# Patient Record
Sex: MALE | Race: NATIVE HAWAIIAN OR OTHER PACIFIC ISLANDER | Employment: FULL TIME | URBAN - METROPOLITAN AREA
[De-identification: names, ages, dates, MRNs, and addresses within clinical notes are randomized per-mention and may not be internally consistent; named-entity substitution may affect disease eponyms.]

---

## 2020-02-06 ENCOUNTER — OFFICE VISIT (OUTPATIENT)
Dept: ORTHOPEDIC SURGERY | Age: 65
End: 2020-02-06
Payer: COMMERCIAL

## 2020-02-06 VITALS — WEIGHT: 185 LBS | HEIGHT: 72 IN | BODY MASS INDEX: 25.06 KG/M2

## 2020-02-06 PROCEDURE — 99213 OFFICE O/P EST LOW 20 MIN: CPT | Performed by: ORTHOPAEDIC SURGERY

## 2020-02-06 PROCEDURE — L3170 FOOT PLAS HEEL STABI PRE OTS: HCPCS | Performed by: ORTHOPAEDIC SURGERY

## 2020-02-06 NOTE — PROGRESS NOTES
application of this item were provided. They were instructed to contact the office immediately should the brace result in increased pain, decreased sensation, increased swelling or worsening of the condition. Treatment Plan:  I discussed with the patient the nature of osteoarthritis of the knee. We talked about treatment of arthritis and the various options that are involved with this. The patient understands that the treatments can vary from essentially doing nothing to a total joint replacement arthroplasty for arthritis. I then went on to describe the utilization of glucosamine and chondroitin sulfate as a joint nutrition product. We talked about the fact that this is essentially a joint vitamin with typically minimal side effects. We also talked about utilization of prescription over-the-counter anti-inflammatory medications as the next option. We also discussed the possibility of brace wear or orthotic wear if the patient has significant varus alignment. We then went on to discuss the possibility of Visco supplementation with hyaluronate products. We talked about the typical course of this type of treatment and the fact that often times in the treatment for significant arthritis, this is successful less than half the time. We also talked about the corticosteroid injections and the fact that this can give a brief window of relief, but does not cure the problem; in fact, the pain often has a rebound effect in 6-10 weeks after the steroid has worn off. We also discussed arthroscopy surgery in attempts to debride the joint, but the fact that this is relatively unreliable treatment in the face of significant arthritis. It can occasionally be used, particularly if there is significant meniscus pathology. Lastly we discussed total joint replacement arthroplasty as the final and definitive step in treatment of arthritis.  Patient realizes the magnitude of this type of treatment as well as having voiced a general

## 2020-08-27 ENCOUNTER — OFFICE VISIT (OUTPATIENT)
Dept: ORTHOPEDIC SURGERY | Age: 65
End: 2020-08-27
Payer: COMMERCIAL

## 2020-08-27 VITALS — HEIGHT: 72 IN | WEIGHT: 185 LBS | BODY MASS INDEX: 25.06 KG/M2

## 2020-08-27 PROCEDURE — 99214 OFFICE O/P EST MOD 30 MIN: CPT | Performed by: ORTHOPAEDIC SURGERY

## 2020-08-27 NOTE — PROGRESS NOTES
Chief Complaint    Knee Pain (lt knee wants to discuss TKR)      History of Present Illness:  Seble Villatoro is a 76 y.o. male who returns today for follow-up on his left knee valgus osteoarthritis. Patient was last seen in February of this year. His primary care physician is Dr. Jace Garcia. Patient states that since his last visit in February he is now having moderate to occasional severe pain with weightbearing activity. He finds that ambulation is decreasing secondary to pain. Pain is mainly over the lateral aspect of the left knee and he feels a sense of instability with descending steps and with pivoting. He has a significant previous history of 2 open procedures with the medial and lateral incision. During one of these procedures he did have a patellar tendon repair performed. He has had previous cortisone injections, over-the-counter medication, prescription NSAID, and he is tried bracing in the past with minimal benefit. PAIN:   Pain Assessment  Location of Pain: Knee  Location Modifiers: Left  Severity of Pain: 6  Frequency of Pain: Intermittent  Aggravating Factors: Walking, Stairs, Standing  Limiting Behavior: Some  Result of Injury: No  Work-Related Injury: No    The patients chronic pain has gradually worsening over the last 1 year. The patient rates pain on a level of 6/10. Pain impacts patients ability to drive, sleep and climb stairs. Medical History:   Patient's medications, allergies, past medical, surgical, social and family histories were reviewed and updated as appropriate. Medication Management  Patient has been treated with NSAIDs, Steroids and Analgesics with Minimal relief for 1 year. Review of Systems:  Relevant review of systems reviewed and available in the patient's chart    Vital Signs: There were no vitals filed for this visit. Body mass index is 25.09 kg/m².     Limitation in Activities of Daily Living (ADLs)  The patient is able to ambulate without the assistance of cane for 5 steps  steps/feet. Walking distance less than 1 block    [facility]: No     Safety Issues: None at this point  Patient is at risk for falls/fall history: No    General Exam:   Constitutional: Patient is adequately groomed with no evidence of malnutrition  DTRs: Deep tendon reflexes are intact  Mental Status: The patient is oriented to time, place and person. The patient's mood and affect are appropriate. Lymphatic: The lymphatic examination bilaterally reveals all areas to be without enlargement or induration. Vascular: Examination reveals no swelling or calf tenderness. Peripheral pulses are palpable and 2+. Neurological: The patient has good coordination. There is no weakness or sensory deficit. Left knee Examination:    Inspection:  No erythema or signs of infection. Positive knee effusion. There are no cutaneous lesions. There is a valgus deformity noted. Palpation:  There is tenderness to palpation along the lateral joint line. Range of Motion: 0 extension to 110 degrees of active flexion. Crepitation present throughout range of motion    Strength:  4+/5 quadriceps and hamstrings    Special Tests: The knee is stable to varus valgus stressing/anterior posterior drawer. Negative Homans test.                                 Skin: There are no rashes, ulcerations or lesions. Gait: mildly antalgic favoring the left side      Additional Comments:     Examination of the right knee reveals intact skin. There is no focal tenderness. The patient demonstrates full painless range of motion with regard to flexion and extension. Strength is 5/5 throughout all planes. Ligamentous stability is grossly intact. Examination of the left hip reveals intact skin. The patient demonstrates full painless range of motion with regards to flexion, abduction, internal and external rotation. There is no tenderness about the greater trochanter. There is a negative straight leg raise against resistance. Strength is 5/5 throughout all planes. Radiology:   X-rays obtained and reviewed in office:  Views 4 views to include  Location AP, flexed knee, lateral and sunrise views of the left knee:    Impression:   There is end-stage osteoarthritis within the lateral compartment with sclerosis and osteophyte formation present. There is erosion of the lateral tibia noted. The patellofemoral joint shows moderate to severe osteoarthritis with peripheral osteophyte formation. No fractures are identified. Failed Outpatient management or Previous Surgical Intervention  Patient has undergone conservative treatment of left knee for the past 1 year. Patient has undergone therapy consisting of at least 3 months of physical therapy which included muscle strengthening and flexibility program, cortisone injections, Visco supplementation, different oral medications including NSAIDs and analgesics, efforts at weight loss, and activity modification for years with no improvement in  pain relief or function. Impression:  Encounter Diagnosis   Name Primary?  Primary osteoarthritis of left knee Yes       Office Procedures:  Orders Placed This Encounter   Procedures    XR KNEE LEFT (MIN 4 VIEWS)     Standing Status:   Future     Number of Occurrences:   1     Standing Expiration Date:   8/27/2021   Guill[facility] Parisi PT [address] [facility]     Referral Priority:   Routine     Referral Type:   Eval and Treat     Referral Reason:   Specialty Services Required     Requested Specialty:   Physical Therapy     Number of Visits Requested:   1       Treatment Plan:  I discussed with the patient the nature of osteoarthritis of the knee. We talked about treatment of arthritis and the various options that are involved with this. The patient understands that the treatments can vary from essentially doing nothing to a total joint replacement arthroplasty for arthritis.  I then went on to describe the utilization of glucosamine and chondroitin sulfate as a joint nutrition product. We talked about the fact that this is essentially a joint vitamin with typically minimal side effects. We also talked about utilization of prescription over-the-counter anti-inflammatory medications as the next option. We also discussed the possibility of brace wear or orthotic wear if the patient has significant varus alignment. We then went on to discuss the possibility of Visco supplementation with hyaluronate products. We talked about the typical course of this type of treatment and the fact that often times in the treatment for significant arthritis, this is successful less than half the time. We also talked about the corticosteroid injections and the fact that this can give a brief window of relief, but does not cure the problem; in fact, the pain often has a rebound effect in 6-10 weeks after the steroid has worn off. We also discussed arthroscopy surgery in attempts to debride the joint, but the fact that this is relatively unreliable treatment in the face of significant arthritis. It can occasionally be used, particularly if there is significant meniscus pathology. Lastly we discussed total joint replacement arthroplasty as the final and definitive step in treatment of arthritis. Patient realizes the magnitude of this type of treatment as well as having voiced a general understanding to the duration of the prosthesis. The patient voiced understanding to these continuum of treatment options. At this point the patient has failed conservative treatment as mentioned above. They would like to go ahead and proceed with a left Soren total knee replacement with robotic assistance. This does require nondiagnostic CT scan for surgical planning. The patient is aware that this may or may not be covered by the insurance provider and would be an out-of-pocket expense.     We discussed the risk, benefits, and potential complications of knee replacement arthroplasty surgery. The patient voiced their understanding to concerns that include infection, deep vein thrombosis, neurological injury, and delayed rehabilitation. The patient also realizes that there are concerns regarding the potential need for manipulation under anesthesia if range of motion proves to be problematic. The patient also understands that there is always a chance of dystrophy and anesthetic complications that would include a stroke, cardiopulmonary pathology, and even death. We also discussed the rehabilitation process involved with this operation and options that involved not only the hospitalization but outpatient physical therapy and independent home exercise program.  The patient also realizes the need for a knee brace and ambulatory aids in the rehabilitation process as well as the very significant role that the patient plays in terms of rehabilitation after this type of operation. The patient also understands that although the patient typically functional by 6-8 weeks postop that it may take 9 months to year for full recovery. All questions were answered. Today we have had a discussion regarding his diagnosis and treatment. Today we have gone through the details of total knee arthroplasty and the patient would like to proceed with total knee arthroplasty in January 2021. Today he was registered with [facility] and the consent was signed. Patient will follow-up in December 2020 for preoperative discussion. In the meantime the patient will work on his range of motion and strength and will be sent to PT.     Demand matching tool: Advanced

## 2020-08-27 NOTE — LETTER
The undersigned represents that he or she is duly authorized to execute to this consent for and on the behalf of the above named patient.    ________________________________             __________________________________________  Witness                                                                         Parent/Spouse/Guardian/Other:_________________    Medical Record#  Insurance  Smartphone:  Yes   Or   No  Email:                   You have signed a consent to have a total joint replacement surgery performed. Before you can proceed with surgery the following things must be done. Please use this form as a checklist.      _____   Please schedule your Physical Therapy functional evaluation. (Allowed locations are listed on the order)    _____   Please take your lab orders and get your blood work done at a [facility]. (If needed, please use the web site to find the location closest to you:  [email or web address]) You do not need an appointment and you do not need to fast.    _____  If you did not register in the office, you will need to go to the website for [facility] [email or web address]) to complete registration and the medical questionnaire prior to your physical therapy evaluation. Do not schedule an appointment with your primary care physician until you have a surgery date. This pre-op history and physical has to be within 30 days of the surgery. _____  CT Scan. This will be scheduled once your surgery has been scheduled. _____  Information about the pre-op class, your CT scan, your post-op appointment and cold therapy options will be in your surgery packet that will be mailed to you after you are scheduled for surgery. Once you have completed both the labs and the Physical Therapy evaluation please call Litjaret Nafisa @ [phone] to let her know.   Once verification of the PT Evaluation and completed labs has been determined you will be called and set up for surgery. This may take 1-2 days to check results and return your phone call. You will not be called about lab results if everything is normal.      Please make sure you have not blocked our number. Crista Cueva will call from [phone] / [phone]. Also, please make sure your voice mail is not full.

## 2020-12-03 ENCOUNTER — OFFICE VISIT (OUTPATIENT)
Dept: ORTHOPEDIC SURGERY | Age: 65
End: 2020-12-03
Payer: COMMERCIAL

## 2020-12-03 PROCEDURE — 99213 OFFICE O/P EST LOW 20 MIN: CPT | Performed by: ORTHOPAEDIC SURGERY

## 2020-12-03 PROCEDURE — L1830 KO IMMOB CANVAS LONG PRE OTS: HCPCS | Performed by: ORTHOPAEDIC SURGERY

## 2020-12-03 NOTE — PROGRESS NOTES
Chief Complaint    Follow-up (LEFT TKR scheduled 1/13/20; has not completed labs and PT yet)      History of Present Illness:  Ellie Zhou is a 76 y.o. male who returns today for follow-up on his left knee valgus osteoarthritis. We last saw the patient back in August.  At that time we did consent the patient for a left total knee replacement but he had decided not to proceed with surgery until January. He is now scheduled for his left total knee replacement surgery on January 13. We asked that he come in this month prior to surgery to discuss any remaining details and to get him scheduled for his lab work and PT evaluation. He continues to complain of fairly persistent pain in his left knee with episodes of buckling and instability. PAIN:   Pain Assessment  Location of Pain: Knee  Location Modifiers: Left  Severity of Pain: 4  Quality of Pain: Buckling, Dull  Duration of Pain: Persistent  Frequency of Pain: Intermittent  Aggravating Factors: Walking, Standing, Squatting(weakness)  Limiting Behavior: Some  Relieving Factors: Rest    The patients chronic pain has gradually worsening over the last 1 year. The patient rates pain on a level of 6/10. Pain impacts patients ability to drive, sleep and climb stairs. Medical History:   Patient's medications, allergies, past medical, surgical, social and family histories were reviewed and updated as appropriate. Medication Management  Patient has been treated with NSAIDs, Steroids and Analgesics with Minimal relief for 1 year. Review of Systems:  Relevant review of systems reviewed and available in the patient's chart    Vital Signs: There were no vitals filed for this visit. There is no height or weight on file to calculate BMI. Limitation in Activities of Daily Living (ADLs)  The patient is able to ambulate without the assistance of cane for 5 steps  steps/feet.       Walking distance less than 1 block    Extended Care / Skilled Nursing Facility: No     Safety Issues: None at this point  Patient is at risk for falls/fall history: No    General Exam:   Constitutional: Patient is adequately groomed with no evidence of malnutrition  DTRs: Deep tendon reflexes are intact  Mental Status: The patient is oriented to time, place and person. The patient's mood and affect are appropriate. Lymphatic: The lymphatic examination bilaterally reveals all areas to be without enlargement or induration. Vascular: Examination reveals no swelling or calf tenderness. Peripheral pulses are palpable and 2+. Neurological: The patient has good coordination. There is no weakness or sensory deficit. Left knee Examination:    Inspection:  No erythema or signs of infection. Positive knee effusion. There are no cutaneous lesions. There is a valgus deformity noted. Palpation:  There is tenderness to palpation along the lateral joint line. Range of Motion: 0 extension to 110 degrees of active flexion. Crepitation present throughout range of motion    Strength:  4+/5 quadriceps and hamstrings    Special Tests: The knee is stable to varus valgus stressing/anterior posterior drawer. Negative Homans test.                                 Skin: There are no rashes, ulcerations or lesions. Gait: mildly antalgic favoring the left side      Additional Comments:     Examination of the right knee reveals intact skin. There is no focal tenderness. The patient demonstrates full painless range of motion with regard to flexion and extension. Strength is 5/5 throughout all planes. Ligamentous stability is grossly intact. Radiology:   X-rays previously obtained of his left knee were reviewed today  Views 4 views to include  Location AP, flexed knee, lateral and sunrise views of the left knee:    Impression:   There is end-stage osteoarthritis within the lateral compartment with sclerosis and osteophyte formation present. There is erosion of the lateral tibia noted.   The patellofemoral joint shows moderate to severe osteoarthritis with peripheral osteophyte formation. No fractures are identified. Failed Outpatient management or Previous Surgical Intervention  Patient has undergone conservative treatment of left knee for the past 1 year. Patient has undergone therapy consisting of at least 3 months of physical therapy which included muscle strengthening and flexibility program, cortisone injections, Visco supplementation, different oral medications including NSAIDs and analgesics, efforts at weight loss, and activity modification for years with no improvement in  pain relief or function. Impression:  Encounter Diagnosis   Name Primary?  Primary osteoarthritis of left knee Yes       Office Procedures:  No orders of the defined types were placed in this encounter. Treatment Plan:  I discussed with the patient the nature of osteoarthritis of the knee. We talked about treatment of arthritis and the various options that are involved with this. The patient understands that the treatments can vary from essentially doing nothing to a total joint replacement arthroplasty for arthritis. I then went on to describe the utilization of glucosamine and chondroitin sulfate as a joint nutrition product. We talked about the fact that this is essentially a joint vitamin with typically minimal side effects. We also talked about utilization of prescription over-the-counter anti-inflammatory medications as the next option. We also discussed the possibility of brace wear or orthotic wear if the patient has significant varus alignment. We then went on to discuss the possibility of Visco supplementation with hyaluronate products. We talked about the typical course of this type of treatment and the fact that often times in the treatment for significant arthritis, this is successful less than half the time.  We also talked about the corticosteroid injections and the fact that this can give a brief window of relief, but does not cure the problem; in fact, the pain often has a rebound effect in 6-10 weeks after the steroid has worn off. We also discussed arthroscopy surgery in attempts to debride the joint, but the fact that this is relatively unreliable treatment in the face of significant arthritis. It can occasionally be used, particularly if there is significant meniscus pathology. Lastly we discussed total joint replacement arthroplasty as the final and definitive step in treatment of arthritis. Patient realizes the magnitude of this type of treatment as well as having voiced a general understanding to the duration of the prosthesis. The patient voiced understanding to these continuum of treatment options. At this point the patient has failed conservative treatment as mentioned above. They would like to go ahead and proceed with a left Soren total knee replacement with robotic assistance. This does require nondiagnostic CT scan for surgical planning. The patient is aware that this may or may not be covered by the insurance provider and would be an out-of-pocket expense. We discussed the risk, benefits, and potential complications of knee replacement arthroplasty surgery. The patient voiced their understanding to concerns that include infection, deep vein thrombosis, neurological injury, and delayed rehabilitation. The patient also realizes that there are concerns regarding the potential need for manipulation under anesthesia if range of motion proves to be problematic. The patient also understands that there is always a chance of dystrophy and anesthetic complications that would include a stroke, cardiopulmonary pathology, and even death.   We also discussed the rehabilitation process involved with this operation and options that involved not only the hospitalization but outpatient physical therapy and independent home exercise program.  The patient also realizes the need for a knee brace and ambulatory aids in the rehabilitation process as well as the very significant role that the patient plays in terms of rehabilitation after this type of operation. The patient also understands that although the patient typically functional by 6-8 weeks postop that it may take 9 months to year for full recovery. All questions were answered.     Demand matching tool: Advanced

## 2020-12-03 NOTE — LETTER
Attention    These are medical screening labs, not pre-admission surgery labs. Evelia Frost M.D. Phone: [phone] / [phone] ([phone])   Fax:  [phone], [facility]    Date:  12/3/2020    Name: Zachariah Lux    : 1955    Please take this form with you.       THE FOLLOWING LABS NEED TO BE COMPLETED:    _x__UA  _x__URINE C/S (THIS NEEDS TO BE DONE EVEN IF THE UA IS NORMAL)  _x__CBC W/ DIFF  _x__PT/INR  _x__PTT  _x__TRANSFERRIN  _x__ALBUMIN  _x__CHEM 7  _x__HEMAGLOBIN A1-C  ____OTHER: _____________________________________________                         Diagnosis:  LEFT KNEE OSTEOARTHRITIS            RT KNEE OA M17.11      LT KNEE OA M17.12         RT HIP OA  M16.11         LT HIP OA M16.12         RT SHLD OA  M19.011   LT SHLD OA  M19.012             Z01.812  (Pre-op examination code)      12/3/2020 4:50 PM  Alden Scott PA-C

## 2020-12-23 ENCOUNTER — HOSPITAL ENCOUNTER (OUTPATIENT)
Age: 65
Discharge: HOME | End: 2020-12-23
Payer: COMMERCIAL

## 2020-12-23 LAB
ALBUMIN SERPL-MCNC: 4.2 G/DL (ref 3.4–5)
ANION GAP SERPL CALCULATED.3IONS-SCNC: 13 MMOL/L (ref 3–16)
APTT: 29.3 SEC (ref 24.2–36.2)
BASOPHILS ABSOLUTE: 0.1 K/UL (ref 0–0.2)
BASOPHILS RELATIVE PERCENT: 1 %
BILIRUBIN URINE: NEGATIVE
BLOOD, URINE: NEGATIVE
BUN BLDV-MCNC: 16 MG/DL (ref 7–20)
CALCIUM SERPL-MCNC: 9.9 MG/DL (ref 8.3–10.6)
CHLORIDE BLD-SCNC: 105 MMOL/L (ref 99–110)
CLARITY: CLEAR
CO2: 23 MMOL/L (ref 21–32)
COLOR: YELLOW
CREAT SERPL-MCNC: 1.2 MG/DL (ref 0.8–1.3)
EOSINOPHILS ABSOLUTE: 0.3 K/UL (ref 0–0.6)
EOSINOPHILS RELATIVE PERCENT: 4.3 %
GFR AFRICAN AMERICAN: >60
GFR NON-AFRICAN AMERICAN: >60
GLUCOSE BLD-MCNC: 94 MG/DL (ref 70–99)
GLUCOSE URINE: NEGATIVE MG/DL
HCT VFR BLD CALC: 45.5 % (ref 40.5–52.5)
HEMOGLOBIN: 15.2 G/DL (ref 13.5–17.5)
INR BLD: 0.91 (ref 0.86–1.14)
KETONES, URINE: NEGATIVE MG/DL
LEUKOCYTE ESTERASE, URINE: NEGATIVE
LYMPHOCYTES ABSOLUTE: 1.2 K/UL (ref 1–5.1)
LYMPHOCYTES RELATIVE PERCENT: 20.7 %
MCH RBC QN AUTO: 32 PG (ref 26–34)
MCHC RBC AUTO-ENTMCNC: 33.5 G/DL (ref 31–36)
MCV RBC AUTO: 95.8 FL (ref 80–100)
MICROSCOPIC EXAMINATION: NORMAL
MONOCYTES ABSOLUTE: 0.4 K/UL (ref 0–1.3)
MONOCYTES RELATIVE PERCENT: 7.1 %
NEUTROPHILS ABSOLUTE: 4 K/UL (ref 1.7–7.7)
NEUTROPHILS RELATIVE PERCENT: 66.9 %
NITRITE, URINE: NEGATIVE
PDW BLD-RTO: 13.7 % (ref 12.4–15.4)
PH UA: 6 (ref 5–8)
PLATELET # BLD: 206 K/UL (ref 135–450)
PMV BLD AUTO: 9.9 FL (ref 5–10.5)
POTASSIUM SERPL-SCNC: 4.4 MMOL/L (ref 3.5–5.1)
PROTEIN UA: NEGATIVE MG/DL
PROTHROMBIN TIME: 10.5 SEC (ref 10–13.2)
RBC # BLD: 4.76 M/UL (ref 4.2–5.9)
SODIUM BLD-SCNC: 141 MMOL/L (ref 136–145)
SPECIFIC GRAVITY UA: 1.02 (ref 1–1.03)
TRANSFERRIN: 174 MG/DL (ref 200–360)
URINE TYPE: NORMAL
UROBILINOGEN, URINE: 0.2 E.U./DL
WBC # BLD: 6 K/UL (ref 4–11)

## 2020-12-23 PROCEDURE — 81003 URINALYSIS AUTO W/O SCOPE: CPT

## 2020-12-23 PROCEDURE — 85730 THROMBOPLASTIN TIME PARTIAL: CPT

## 2020-12-23 PROCEDURE — 36415 COLL VENOUS BLD VENIPUNCTURE: CPT

## 2020-12-23 PROCEDURE — 80048 BASIC METABOLIC PNL TOTAL CA: CPT

## 2020-12-23 PROCEDURE — 87086 URINE CULTURE/COLONY COUNT: CPT

## 2020-12-23 PROCEDURE — 82040 ASSAY OF SERUM ALBUMIN: CPT

## 2020-12-23 PROCEDURE — 85610 PROTHROMBIN TIME: CPT

## 2020-12-23 PROCEDURE — 85025 COMPLETE CBC W/AUTO DIFF WBC: CPT

## 2020-12-23 PROCEDURE — 83036 HEMOGLOBIN GLYCOSYLATED A1C: CPT

## 2020-12-23 PROCEDURE — 84466 ASSAY OF TRANSFERRIN: CPT

## 2020-12-24 LAB
ESTIMATED AVERAGE GLUCOSE: 91.1 MG/DL
HBA1C MFR BLD: 4.8 %
URINE CULTURE, ROUTINE: NORMAL

## 2020-12-29 ENCOUNTER — HOSPITAL ENCOUNTER (OUTPATIENT)
Dept: PHYSICAL THERAPY | Age: 65
Setting detail: THERAPIES SERIES
Discharge: HOME | End: 2020-12-29
Payer: COMMERCIAL

## 2020-12-29 PROCEDURE — 97161 PT EVAL LOW COMPLEX 20 MIN: CPT

## 2020-12-29 PROCEDURE — 97110 THERAPEUTIC EXERCISES: CPT

## 2020-12-29 NOTE — PLAN OF CARE
[facility], [address] [place] [address]  Phone: [phone]  Fax [phone]     Physical Therapy Certification    Dear Referring Practitioner: Raman Carroll MD,    We had the pleasure of evaluating the following patient for physical therapy services at [address] [place] [address]. A summary of our findings can be found in the initial assessment below. This includes our plan of care. If you have any questions or concerns regarding these findings, please do not hesitate to contact me at the office phone number checked above. Thank you for the referral.       Physician Signature:_______________________________Date:__________________  By signing above (or electronic signature), therapists plan is approved by physician    Patient: Jennifer Felton   : 1955   MRN: [id number]  Referring Physician: Referring Practitioner: Raman Carroll MD      Evaluation Date: 2020      Medical Diagnosis Information:  Diagnosis: M17.12 Primary OA of the L knee   Treatment Diagnosis: M25.562 Left knee pain; M17.12 OA of the Left knee                                         Insurance information: PT Insurance Information: [place]     Precautions/ Contra-indications: None  Latex Allergy:  [x]NO      []YES  Preferred Language for Healthcare:   [x]English       []other:    SUBJECTIVE: Patient stated complaint:Patient presents with L knee pain and is here for prehab evaluation for upcoming L TKA. He has a previous history of L ACLR and meniscus repair. He hopes to return to golf and more regular activity.      Relevant Medical History: previous L ACLR, meniscus repair  Functional Disability Index: LEFS 61/80; 24% disability     Height 6' Weight 185 lb  Pain Scale: 2/10  Easing factors: rest, ibuprofen   Provocative factors: walking downhill, increased activity      Type: []Constant   [x]Intermittent  []Radiating []Localized []other: Numbness/Tingling: [x]    Occupation/School:      Living Status/Prior Level of Function: Independent with ADLs and IADLs, lives at home with his wife    OBJECTIVE:    Media Information                     Reflexes/Myotomes:    [x]Dermatomes/Myotomes intact    [x]Reflexes equal and normal bilaterally   []Other:    Joint mobility: L tibiofemoral and PFJ   []Normal    [x]Hypo   []Hyper    Palpation: minimal ttp     Functional Mobility/Transfers: WFL    Posture: WFL    Gait: (include devices/WB status) antalgic gait, (+) trendelenberg     Orthopedic Special Tests: See scanned forms                        [x] Patient history, allergies, meds reviewed. Medical chart reviewed. See intake form. Review Of Systems (ROS):  [x]Performed Review of systems (Integumentary, CardioPulmonary, Neurological) by intake and observation. Intake form has been scanned into medical record. Patient has been instructed to contact their primary care physician regarding ROS issues if not already being addressed at this time.       Co-morbidities/Complexities (which will affect course of rehabilitation):    []None           Arthritic conditions   []Rheumatoid arthritis (M05.9)  [x]Osteoarthritis (M19.91)   Cardiovascular conditions   []Hypertension (I10)  []Hyperlipidemia (E78.5)  []Angina pectoris (I20)  []Atherosclerosis (I70)   Musculoskeletal conditions   []Disc pathology   []Congenital spine pathologies   []Prior surgical intervention  []Osteoporosis (M81.8)  []Osteopenia (M85.8)   Endocrine conditions   []Hypothyroid (E03.9)  []Hyperthyroid Gastrointestinal conditions   []Constipation (X10.19)   Metabolic conditions   []Morbid obesity (E66.01)  []Diabetes type 1(E10.65) or 2 (E11.65)   []Neuropathy (G60.9)     Pulmonary conditions   []Asthma (J45)  []Coughing   []COPD (J44.9)   Psychological Disorders  []Anxiety (F41.9)  []Depression (F32.9)   []Other:   []Other: Barriers to/and or personal factors that will affect rehab potential:              [x]Age  []Sex              []Motivation/Lack of Motivation                        [x]Co-Morbidities              []Cognitive Function, education/learning barriers              []Environmental, home barriers              []profession/work barriers  []past PT/medical experience  []other:  Justification: Patient is expected to rehab well with skilled PT. Falls Risk Assessment (30 days):    [x] Falls Risk assessed and no intervention required.   [] Falls Risk assessed and Patient requires intervention due to being higher risk   TUG score (>12s at risk):     [] Falls education provided, including          ASSESSMENT:    Functional Impairments:     [x]Noted lumbar/proximal hip/LE joint hypomobility   [x]Decreased LE functional ROM   [x]Decreased core/proximal hip strength and neuromuscular control   [x]Decreased LE functional strength   [x]Reduced balance/proprioceptive control   []other:      Functional Activity Limitations (from functional questionnaire and intake)   [x]Reduced ability to tolerate prolonged functional positions   [x]Reduced ability or difficulty with changes of positions or transfers between positions   []Reduced ability to maintain good posture and demonstrate good body mechanics with sitting, bending, and lifting   []Reduced ability to sleep   [] Reduced ability or tolerance with driving and/or computer work   [x]Reduced ability to perform lifting, carrying tasks   [x]Reduced ability to squat   []Reduced ability to forward bend   [x]Reduced ability to ambulate prolonged functional periods/distances/surfaces   [x]Reduced ability to ascend/descend stairs   [x]Reduced ability to run, hop, cut or jump   []other:    Participation Restrictions   [x]Reduced participation in self care activities   [x]Reduced participation in home management activities   [x]Reduced participation in work activities [x]Reduced participation in social activities. []Reduced participation in sport/recreation activities. Classification :    []Signs/symptoms consistent with post-surgical status including decreased ROM, strength and function. []Signs/symptoms consistent with joint sprain/strain   []Signs/symptoms consistent with patella-femoral syndrome   [x]Signs/symptoms consistent with knee OA/hip OA   []Signs/symptoms consistent with internal derangement of knee/Hip   []Signs/symptoms consistent with functional hip weakness/NMR control      []Signs/symptoms consistent with tendinitis/tendinosis    []signs/symptoms consistent with pathology which may benefit from Dry needling      []other:      Prognosis/Rehab Potential:      []Excellent   [x]Good    []Fair   []Poor    Tolerance of evaluation/treatment:    []Excellent   [x]Good    []Fair   []Poor  Physical Therapy Evaluation Complexity Justification  [x] A history of present problem with:  [] no personal factors and/or comorbidities that impact the plan of care;  []1-2 personal factors and/or comorbidities that impact the plan of care  []3 personal factors and/or comorbidities that impact the plan of care  [x] An examination of body systems using standardized tests and measures addressing any of the following: body structures and functions (impairments), activity limitations, and/or participation restrictions;:  [] a total of 1-2 or more elements   [] a total of 3 or more elements   [] a total of 4 or more elements   [x] A clinical presentation with:  [x] stable and/or uncomplicated characteristics   [] evolving clinical presentation with changing characteristics  [] unstable and unpredictable characteristics;   [x] Clinical decision making of [x] low, [] moderate, [] high complexity using standardized patient assessment instrument and/or measurable assessment of functional outcome.     [x] EVAL (LOW) 37384 (typically 20 minutes face-to-face) [] EVAL (MOD) 50629 (typically 30 minutes face-to-face)  [] EVAL (HIGH) 33538 (typically 45 minutes face-to-face)  [] RE-EVAL       PLAN:   Frequency/Duration:  1 days per week for 1 Weeks:  Interventions:  [x]  Therapeutic exercise including: strength training, ROM, for Lower extremity and core   [x]  NMR activation and proprioception for LE, Glutes and Core   [x]  Manual therapy as indicated for LE, Hip and spine to include: Dry Needling/IASTM, STM, PROM, Gr I-IV mobilizations, manipulation. [x] Modalities as needed that may include: thermal agents, E-stim, Biofeedback, US, iontophoresis as indicated  [x] Patient education on joint protection, postural re-education, activity modification, progression of HEP. [x] Patient appears to be functionally prepared for surgery and will continue with a home exercise program until surgery date. [] Patient will be ready for surgery with a short period of structured physical therapy  [] Patient does not appear to be functionally ready for surgery and requires a prolonged  structure program    At this point, it appears patient's post-operative discharge status from hospital should be:   [x] Home with home exercise program and outpatient PT  [] Home with home health care and   [] Skilled nursing facility/ Inpatient Rehab    HEP instruction: Jany Massed: [id number] (see scanned forms)    GOALS:  Patient stated goal: Patient will be able to walk without L knee pain. Therapist goals for Patient:   Short Term Goals: To be achieved in: 1  weeks  1. Independent in HEP and progression per patient tolerance, in order to prevent re-injury. [x] Progressing: [] Met: [] Not Met: [] Adjusted  2. Patient will have a decrease in pain to facilitate improvement in movement, function, and ADLs as indicated by Functional Deficits.   [x] Progressing: [] Met: [] Not Met: [] Adjusted      Electronically signed by:  Shania Agrawal, PT, DPT, [facility] [id number]

## 2021-01-07 ENCOUNTER — TELEPHONE (OUTPATIENT)
Dept: ORTHOPEDIC SURGERY | Age: 66
End: 2021-01-07

## 2021-01-08 ENCOUNTER — OFFICE VISIT (OUTPATIENT)
Dept: PRIMARY CARE CLINIC | Age: 66
End: 2021-01-08
Payer: COMMERCIAL

## 2021-01-08 DIAGNOSIS — Z01.818 PREOP EXAMINATION: Primary | ICD-10-CM

## 2021-01-08 PROCEDURE — 99211 OFF/OP EST MAY X REQ PHY/QHP: CPT | Performed by: NURSE PRACTITIONER

## 2021-01-09 LAB — SARS-COV-2: NOT DETECTED

## 2021-01-12 DIAGNOSIS — M17.12 PRIMARY OSTEOARTHRITIS OF LEFT KNEE: Primary | ICD-10-CM

## 2021-01-12 RX ORDER — ONDANSETRON 4 MG/1
4 TABLET, FILM COATED ORAL 3 TIMES DAILY PRN
Qty: 15 TABLET | Refills: 0 | Status: SHIPPED | OUTPATIENT
Start: 2021-01-12

## 2021-01-12 RX ORDER — METHYLPREDNISOLONE 4 MG/1
TABLET ORAL
Qty: 1 KIT | Refills: 0 | Status: SHIPPED | OUTPATIENT
Start: 2021-01-12 | End: 2021-01-18

## 2021-01-12 RX ORDER — OXYCODONE HYDROCHLORIDE AND ACETAMINOPHEN 5; 325 MG/1; MG/1
1 TABLET ORAL EVERY 6 HOURS PRN
Qty: 28 TABLET | Refills: 0 | Status: SHIPPED | OUTPATIENT
Start: 2021-01-12 | End: 2021-01-19

## 2021-01-12 RX ORDER — ASPIRIN 325 MG
325 TABLET ORAL 2 TIMES DAILY
Qty: 60 TABLET | Refills: 0 | Status: SHIPPED | OUTPATIENT
Start: 2021-01-14

## 2021-01-12 RX ORDER — CEPHALEXIN 500 MG/1
500 CAPSULE ORAL 4 TIMES DAILY
Qty: 4 CAPSULE | Refills: 0 | Status: SHIPPED | OUTPATIENT
Start: 2021-01-12

## 2021-01-12 RX ORDER — MELOXICAM 15 MG/1
15 TABLET ORAL DAILY
Qty: 90 TABLET | Refills: 1 | Status: SHIPPED | OUTPATIENT
Start: 2021-01-12

## 2021-01-12 RX ORDER — CYCLOBENZAPRINE HCL 10 MG
10 TABLET ORAL 3 TIMES DAILY PRN
Qty: 30 TABLET | Refills: 0 | Status: SHIPPED | OUTPATIENT
Start: 2021-01-12 | End: 2021-01-22

## 2021-01-15 DIAGNOSIS — Z96.652 STATUS POST TOTAL LEFT KNEE REPLACEMENT: Primary | ICD-10-CM

## 2021-01-15 DIAGNOSIS — M17.12 PRIMARY OSTEOARTHRITIS OF LEFT KNEE: ICD-10-CM

## 2021-01-19 ENCOUNTER — APPOINTMENT (OUTPATIENT)
Dept: PHYSICAL THERAPY | Age: 66
End: 2021-01-19
Payer: COMMERCIAL

## 2021-01-25 DIAGNOSIS — M17.12 PRIMARY OSTEOARTHRITIS OF LEFT KNEE: ICD-10-CM

## 2021-01-25 DIAGNOSIS — Z96.652 STATUS POST TOTAL LEFT KNEE REPLACEMENT: Primary | ICD-10-CM

## 2021-01-26 ENCOUNTER — TELEPHONE (OUTPATIENT)
Dept: ORTHOPEDIC SURGERY | Age: 66
End: 2021-01-26

## 2021-01-26 ENCOUNTER — HOSPITAL ENCOUNTER (OUTPATIENT)
Dept: PHYSICAL THERAPY | Age: 66
Setting detail: THERAPIES SERIES
Discharge: HOME | End: 2021-01-26
Payer: COMMERCIAL

## 2021-01-26 PROCEDURE — 97161 PT EVAL LOW COMPLEX 20 MIN: CPT

## 2021-01-26 PROCEDURE — 97016 VASOPNEUMATIC DEVICE THERAPY: CPT

## 2021-01-26 PROCEDURE — 97110 THERAPEUTIC EXERCISES: CPT

## 2021-01-26 NOTE — FLOWSHEET NOTE
[facility],  [address] [place]  Phone: [phone]  Fax [phone]    Physical Therapy Treatment Note/ Progress Report:           Date:  2021    Patient Name:  Carine Soto    :  1955  MRN: [id number]  Restrictions/Precautions:    Medical/Treatment Diagnosis Information:  · Diagnosis: M17.12 Primary OA L knee; G49.998 s/p Left TKR DOS 21 at [facility]  · Treatment Diagnosis: M25.562 Left knee pain  Insurance/Certification information:  PT Insurance Information: [employer]  Physician Information:  Referring Practitioner: Dayna Ireland MD  Has the plan of care been signed (Y/N):        []  Yes  [x]  No     Date of Patient follow up with Physician: 21 VV      Is this a Progress Report:     []  Yes  [x]  No        If Yes:  Date Range for reporting period:  Beginning 21  Ending     Progress report will be due (10 Rx or 30 days whichever is less): 3/05/37       Recertification will be due (POC Duration  / 90 days whichever is less): 21     Visit # Insurance Allowable Auth Required   In-person 1 60 []  Yes [x]  No    Telehealth     []  Yes []  No    Total          Functional Scale: LEFS: 27/80;66%    Date assessed:  21      Therapy Diagnosis/Practice Pattern: H      Number of Comorbidities:  []0     [x]1-2    []3+     Latex Allergy:  [x]NO      []YES  Preferred Language for Healthcare:   [x]English       []other:      Pain level:  2/10     SUBJECTIVE:  See eval    OBJECTIVE: See eval  ? Observation:   ? Test measurements:      RESTRICTIONS/PRECAUTIONS: L KTR DOS 21    Exercises/Interventions:     [address]    Therapeutic Ex (97972) Sets/sec Reps Notes/CUES   Quad sets 5\"Hx15      SLR 10x   Quad lag noted, VC for slowed and controlled motion   Heel slides 10\"x10     Seated HS stretch 30\"x3     Seated gastroc stretch 30\"x3 Pt ed on current condition, POC, expectations for therapy, HEP, stair negotiation, and safe use of cane, icing every hour 8'                 Manual Intervention (69769)                                          NMR re-education (07135)   CUES NEEDED                                                         Therapeutic Activity (19865)                                                Therapeutic Exercise and NMR EXR  [x] (10074) Provided verbal/tactile cueing for activities related to strengthening, flexibility, endurance, ROM for improvements in LE, proximal hip, and core control with self care, mobility, lifting, ambulation.  [] (48674) Provided verbal/tactile cueing for activities related to improving balance, coordination, kinesthetic sense, posture, motor skill, proprioception  to assist with LE, proximal hip, and core control in self care, mobility, lifting, ambulation and eccentric single leg control.      NMR and Therapeutic Activities:    [] (38486 or 61718) Provided verbal/tactile cueing for activities related to improving balance, coordination, kinesthetic sense, posture, motor skill, proprioception and motor activation to allow for proper function of core, proximal hip and LE with self care and ADLs  [] (38817) Gait Re-education- Provided training and instruction to the patient for proper LE, core and proximal hip recruitment and positioning and eccentric body weight control with ambulation re-education including up and down stairs     Home Exercise Program:    [x] (88230) Reviewed/Progressed HEP activities related to strengthening, flexibility, endurance, ROM of core, proximal hip and LE for functional self-care, mobility, lifting and ambulation/stair navigation   [] (78546)Reviewed/Progressed HEP activities related to improving balance, coordination, kinesthetic sense, posture, motor skill, proprioception of core, proximal hip and LE for self care, mobility, lifting, and ambulation/stair navigation Manual Treatments:  PROM / STM / Oscillations-Mobs:  G-I, II, III, IV (PA's, Inf., Post.)  [] (78712) Provided manual therapy to mobilize LE, proximal hip and/or LS spine soft tissue/joints for the purpose of modulating pain, promoting relaxation,  increasing ROM, reducing/eliminating soft tissue swelling/inflammation/restriction, improving soft tissue extensibility and allowing for proper ROM for normal function with self care, mobility, lifting and ambulation. Modalities:     [x] GAME READY (VASO)- for significant edema, swelling, pain control. x15'      Charges:  Timed Code Treatment Minutes: 25'   Total Treatment Minutes: 39'     [facility] time in/time out:   (and requires time in and out for each CPT code)    [x] EVAL (LOW) 92635   [] EVAL (MOD) 50240  [] EVAL (HIGH) 12103   [] RE-EVAL     [x] AV(34319) x 2    [] IONTO  [] NMR (60034) x     [x] VASO  [] Manual (80232) x      [] Other:  [] TA x      [] Mech Traction (61052)  [] ES(attended) (16232)      [] ES (un) (87877):       GOALS:   Patient stated goal: Patient will be able to return to golf without L knee pain. [] Progressing: [] Met: [] Not Met: [] Adjusted    Therapist goals for Patient:   Short Term Goals: To be achieved in: 2 weeks  1. Independent in HEP and progression per patient tolerance, in order to prevent re-injury. [] Progressing: [] Met: [] Not Met: [] Adjusted  2. Patient will have a decrease in pain to facilitate improvement in movement, function, and ADLs as indicated by Functional Deficits. [] Progressing: [] Met: [] Not Met: [] Adjusted    Long Term Goals: To be achieved in: 8 weeks  1. Disability index score of 33% or less for the LEFS to assist with reaching prior level of function. [] Progressing: [] Met: [] Not Met: [] Adjusted  2. Patient will demonstrate increased AROM to 125 deg of L KF, 0 deg of L KE to allow for proper joint functioning as indicated by patients Functional Deficits. [] Progressing: [] Met: [] Not Met: [] Adjusted  3. Patient will demonstrate an increase in Strength to good proximal hip strength and control, within 5lb HHD in LE to allow for proper functional mobility as indicated by patients Functional Deficits. [] Progressing: [] Met: [] Not Met: [] Adjusted  4. Patient will return to 95% of functional activities without increased symptoms or restriction. [] Progressing: [] Met: [] Not Met: [] Adjusted  5. Patient will be able to walk his dog without L knee pain(patient specific functional goal)    [] Progressing: [] Met: [] Not Met: [] Adjusted       Progression Towards Functional goals:  [] Patient is progressing as expected towards functional goals listed. [] Progression is slowed due to complexities listed. [] Progression has been slowed due to co-morbidities. [x] Plan just implemented, too soon to assess goals progression  [] Other:         Overall Progression Towards Functional goals/ Treatment Progress Update:  [] Patient is progressing as expected towards functional goals listed. [] Progression is slowed due to complexities/Impairments listed. [] Progression has been slowed due to co-morbidities.   [x] Plan just implemented, too soon to assess goals progression <30days   [] Goals require adjustment due to lack of progress  [] Patient is not progressing as expected and requires additional follow up with physician  [] Other    Prognosis for POC: [x] Good [] Fair  [] Poor      Patient requires continued skilled intervention: [x] Yes  [] No    Treatment/Activity Tolerance:  [x] Patient able to complete treatment  [] Patient limited by fatigue  [] Patient limited by pain    [] Patient limited by other medical complications  [] Other:     ASSESSMENT: See eval.    Return to Play: (if applicable)   []  Stage 1: Intro to Strength   []  Stage 2: Return to Run and Strength   []  Stage 3: Return to Jump and Strength   []  Stage 4: Dynamic Strength and Agility []  Stage 5: Sport Specific Training     []  Ready to Return to Play, Meets All Above Stages   []  Not Ready for Return to Sports   Comments:                               PLAN: See eval.   [] Continue per plan of care [] Alter current plan (see comments above)  [x] Plan of care initiated [] Hold pending MD visit [] Discharge      Electronically signed by:  Marilyn Gaines, PT, DPT, OCS [id number]     Note: If patient does not return for scheduled/ recommended follow up visits, this note will serve as a discharge from care along with most recent update on progress.

## 2021-01-26 NOTE — PLAN OF CARE
[facility], [address], [address] [place] [address]  Phone: [phone]  Fax [phone]     Physical Therapy Certification    Dear Referring Practitioner: Adrianne Sullivan MD,    We had the pleasure of evaluating the following patient for physical therapy services at [address] [place] [address]. A summary of our findings can be found in the initial assessment below. This includes our plan of care. If you have any questions or concerns regarding these findings, please do not hesitate to contact me at the office phone number checked above.   Thank you for the referral.       Physician Signature:_______________________________Date:__________________  By signing above (or electronic signature), therapists plan is approved by physician    Patient: Brant Wray   : 1955   MRN: [id number]  Referring Physician: Referring Practitioner: Adrianne Sullivan MD      Evaluation Date: 2021      Medical Diagnosis Information:  Diagnosis: M17.12 Primary OA L knee; M99.368 s/p Left TKR DOS 21 at [facility]   Treatment Diagnosis: M25.562 Left knee pain                                         Insurance information: PT Insurance Information: BCBS       Precautions/ Contra-indications: s/p L TKR on 21    C-SSRS Triggered by Intake questionnaire (Past 2 wk assessment):   [x] No, Questionnaire did not trigger screening.   [] Yes, Patient intake triggered further evaluation      [] C-SSRS Screening completed  [] PCP notified via Plan of Care  [] Emergency services notified     Latex Allergy:  [x]NO      []YES  Preferred Language for Healthcare:   [x]English       []other: SUBJECTIVE: Patient stated complaint: Patient reports L knee pain after TKR on 1/13/21. He notes his pain is like a tooth ache and disrupts his sleep. He has since been wanting to get back to exercise and back to work. He is currently sleeping in a lazy boy but is still having disrupted sleep. He is only icing and taking Meloxicam for pain and swelling. He is going up/down stairs on his bottom and using a SPC. Patient reports he had 4 appts of home health PT. Relevant Medical History: None  Functional Disability Index: LEFS: 27/80; 66% disability      Height6' Weight 185 lb  Pain Scale: 2/10  Easing factors: ice, pain medication   Provocative factors: stairs, inactivity, prolonged positioning. 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 Wife currently driving him. OBJECTIVE:     ROM LEFT RIGHT   HIP Flex     HIP Abd     HIP Ext     HIP IR     HIP ER     Knee ext Lacking 10 deg    Knee Flex 85 deg pain    Ankle PF     Ankle DF     Ankle In     Ankle Ev     Strength  LEFT RIGHT   HIP Flexors     HIP Abductors     HIP Ext     Hip ER     Knee EXT (quad) Fair (quad lag noted with SLR)  Good    Knee Flex (HS)     Ankle DF     Ankle PF     Ankle Inv     Ankle EV          Circumference  suprapatellar  Mid patellar  Infrapatellar      58.5 cm  56.5cm  43 cm          Reflexes/Sensation:     [x]Dermatomes/Myotomes intact    [x]Reflexes equal and normal bilaterally   []Other:    Joint mobility: L knee/PFJ   []Normal    [x]Hypo   []Hyper    Palpation: minimal ttp of L quad     Functional Mobility/Transfers: WFL    Posture: WFL    Bandages/Dressings/Incisions: good healthy signs of tissue healing. Gait: (include devices/WB status) ambulating with SPC and flexed knee posturing, antalgic gait pattern    Orthopedic Special Tests: N/A                       [x] Patient history, allergies, meds reviewed. Medical chart reviewed. See intake form. Review Of Systems (ROS):  [x]Performed Review of systems (Integumentary, CardioPulmonary, Neurological) by intake and observation. Intake form has been scanned into medical record. Patient has been instructed to contact their primary care physician regarding ROS issues if not already being addressed at this time. Co-morbidities/Complexities (which will affect course of rehabilitation):    []None           Arthritic conditions   []Rheumatoid arthritis (M05.9)  [x]Osteoarthritis (M19.91)   Cardiovascular conditions   []Hypertension (I10)  []Hyperlipidemia (E78.5)  []Angina pectoris (I20)  []Atherosclerosis (I70)   Musculoskeletal conditions   []Disc pathology   []Congenital spine pathologies   []Prior surgical intervention  []Osteoporosis (M81.8)  []Osteopenia (M85.8)   Endocrine conditions   []Hypothyroid (E03.9)  []Hyperthyroid Gastrointestinal conditions   []Constipation (A15.25)   Metabolic conditions   []Morbid obesity (E66.01)  []Diabetes type 1(E10.65) or 2 (E11.65)   []Neuropathy (G60.9)     Pulmonary conditions   []Asthma (J45)  []Coughing   []COPD (J44.9)   Psychological Disorders  []Anxiety (F41.9)  []Depression (F32.9)   []Other:   []Other:          Barriers to/and or personal factors that will affect rehab potential:              [x]Age  []Sex              []Motivation/Lack of Motivation                        [x]Co-Morbidities              []Cognitive Function, education/learning barriers              []Environmental, home barriers              []profession/work barriers  []past PT/medical experience  []other:  Justification: Patient is expected to rehab well with skilled PT. Falls Risk Assessment (30 days): Patient education on ambulation, stairs, and use of SPC  [x] Falls Risk assessed and no intervention required. [] Falls Risk assessed and Patient requires intervention due to being higher risk   TUG score (>12s at risk):     [x] Falls education provided, including see above. ASSESSMENT:   Functional Impairments:     [x]Noted lumbar/proximal hip/LE joint hypomobility   [x]Decreased LE functional ROM   [x]Decreased core/proximal hip strength and neuromuscular control   [x]Decreased LE functional strength   [x]Reduced balance/proprioceptive control   []other:      Functional Activity Limitations (from functional questionnaire and intake)   [x]Reduced ability to tolerate prolonged functional positions   [x]Reduced ability or difficulty with changes of positions or transfers between positions   [x]Reduced ability to maintain good posture and demonstrate good body mechanics with sitting, bending, and lifting   [x]Reduced ability to sleep   [x] Reduced ability or tolerance with driving and/or computer work   [x]Reduced ability to perform lifting, carrying tasks   [x]Reduced ability to squat   [x]Reduced ability to forward bend   [x]Reduced ability to ambulate prolonged functional periods/distances/surfaces   [x]Reduced ability to ascend/descend stairs   [x]Reduced ability to run, hop, cut or jump   []other:    Participation Restrictions   [x]Reduced participation in self care activities   [x]Reduced participation in home management activities   [x]Reduced participation in work activities   [x]Reduced participation in social activities. [x]Reduced participation in sport/recreation activities. Classification :    [x]Signs/symptoms consistent with post-surgical status including decreased ROM, strength and function.    []Signs/symptoms consistent with joint sprain/strain   []Signs/symptoms consistent with patella-femoral syndrome   []Signs/symptoms consistent with knee OA/hip OA []Signs/symptoms consistent with internal derangement of knee/Hip   []Signs/symptoms consistent with functional hip weakness/NMR control      []Signs/symptoms consistent with tendinitis/tendinosis    []signs/symptoms consistent with pathology which may benefit from Dry needling      []other:      Prognosis/Rehab Potential:      []Excellent   [x]Good    []Fair   []Poor    Tolerance of evaluation/treatment:    []Excellent   [x]Good    []Fair   []Poor  Physical Therapy Evaluation Complexity Justification  [x] A history of present problem with:  [] no personal factors and/or comorbidities that impact the plan of care;  [x]1-2 personal factors and/or comorbidities that impact the plan of care  []3 personal factors and/or comorbidities that impact the plan of care  [x] An examination of body systems using standardized tests and measures addressing any of the following: body structures and functions (impairments), activity limitations, and/or participation restrictions;:  [x] a total of 1-2 or more elements   [] a total of 3 or more elements   [] a total of 4 or more elements   [x] A clinical presentation with:  [x] stable and/or uncomplicated characteristics   [] evolving clinical presentation with changing characteristics  [] unstable and unpredictable characteristics;   [x] Clinical decision making of [x] low, [] moderate, [] high complexity using standardized patient assessment instrument and/or measurable assessment of functional outcome.     [x] EVAL (LOW) 62642 (typically 20 minutes face-to-face)  [] EVAL (MOD) 57812 (typically 30 minutes face-to-face)  [] EVAL (HIGH) 77107 (typically 45 minutes face-to-face)  [] RE-EVAL       PLAN:   Frequency/Duration:  2 days per week for 8 Weeks:  Interventions:  [x]  Therapeutic exercise including: strength training, ROM, for Lower extremity and core   [x]  NMR activation and proprioception for LE, Glutes and Core [x]  Manual therapy as indicated for LE, Hip and spine to include: Dry Needling/IASTM, STM, PROM, Gr I-IV mobilizations, manipulation. [x] Modalities as needed that may include: thermal agents, E-stim, Biofeedback, US, iontophoresis as indicated  [x] Patient education on joint protection, postural re-education, activity modification, progression of HEP. HEP instruction: [address]: Loida Erwin (see scanned forms)    GOALS:  Patient stated goal: Patient will be able to return to golf without L knee pain. [] Progressing: [] Met: [] Not Met: [] Adjusted    Therapist goals for Patient:   Short Term Goals: To be achieved in: 2 weeks  1. Independent in HEP and progression per patient tolerance, in order to prevent re-injury. [] Progressing: [] Met: [] Not Met: [] Adjusted  2. Patient will have a decrease in pain to facilitate improvement in movement, function, and ADLs as indicated by Functional Deficits. [] Progressing: [] Met: [] Not Met: [] Adjusted    Long Term Goals: To be achieved in: 8 weeks  1. Disability index score of 33% or less for the LEFS to assist with reaching prior level of function. [] Progressing: [] Met: [] Not Met: [] Adjusted  2. Patient will demonstrate increased AROM to 125 deg of L KF, 0 deg of L KE to allow for proper joint functioning as indicated by patients Functional Deficits. [] Progressing: [] Met: [] Not Met: [] Adjusted  3. Patient will demonstrate an increase in Strength to good proximal hip strength and control, within 5lb HHD in LE to allow for proper functional mobility as indicated by patients Functional Deficits. [] Progressing: [] Met: [] Not Met: [] Adjusted  4. Patient will return to 95% of functional activities without increased symptoms or restriction. [] Progressing: [] Met: [] Not Met: [] Adjusted  5.  Patient will be able to walk his dog without L knee pain(patient specific functional goal)    [] Progressing: [] Met: [] Not Met: [] Adjusted Electronically signed by:  Malik Redd, PT, DPT, [address] [address] [id number]

## 2021-01-29 ENCOUNTER — HOSPITAL ENCOUNTER (OUTPATIENT)
Dept: PHYSICAL THERAPY | Age: 66
Setting detail: THERAPIES SERIES
Discharge: HOME | End: 2021-01-29
Payer: COMMERCIAL

## 2021-01-29 ENCOUNTER — VIRTUAL VISIT (OUTPATIENT)
Dept: ORTHOPEDIC SURGERY | Age: 66
End: 2021-01-29

## 2021-01-29 DIAGNOSIS — Z96.659 STATUS POST TOTAL KNEE REPLACEMENT, UNSPECIFIED LATERALITY: Primary | ICD-10-CM

## 2021-01-29 PROCEDURE — 97110 THERAPEUTIC EXERCISES: CPT

## 2021-01-29 PROCEDURE — 97016 VASOPNEUMATIC DEVICE THERAPY: CPT

## 2021-01-29 PROCEDURE — 97140 MANUAL THERAPY 1/> REGIONS: CPT

## 2021-01-29 NOTE — PROGRESS NOTES
Gina Meadows is a 76 y.o. male being evaluated by a Virtual Visit (video visit) encounter to address concerns as mentioned above. A caregiver was present when appropriate. Due to this being a TeleHealth encounter (During PXYIE-45 public health emergency), evaluation of the following organ systems was limited: Vitals/Constitutional/EENT/Resp/CV/GI//MS/Neuro/Skin/Heme-Lymph-Imm. Pursuant to the emergency declaration under the [address] [place] [address] and the [employer] and [employer] Act, this Virtual Visit was conducted with patient's (and/or legal guardian's) consent, to reduce the patient's risk of exposure to COVID-19 and provide necessary medical care. The patient (and/or legal guardian) has also been advised to contact this office for worsening conditions or problems, and seek emergency medical treatment and/or call 911 if deemed necessary. Services were provided through a video synchronous discussion virtually to substitute for in-person clinic visit. Patient's location was at their home. --Baldemar Rashid PA-C on 1/29/2021 at 12:21 PM    An electronic signature was used to authenticate this note. Diagnosis: Status post left total knee replacement    History of present illness: The patient returns today following total knee replacement. The pain has been well controlled on oral analgesics. They have no complaints of constitutional symptoms. Physical exam: The incision is clean, dry and intact with no drainage. Range of motion is 5 degrees of extension to 105 degrees of flexion. There is expected swelling with no evidence of DVT and a negative Marlene's sign. Neruovascular exam is intact. Assessment/Plan: We would like to begin outpatient physical therapy to restore range of motion and strength. The patient was given local wound care instructions. We did not refill their pain medicaion today. We will follow up in 2-3 for re-evaluation.

## 2021-01-29 NOTE — FLOWSHEET NOTE
[facility],  [address]  Phone: [phone]  Fax [phone]    Physical Therapy Treatment Note/ Progress Report:           Date:  2021    Patient Name:  Orestes Vega    :  1955  MRN: [id number]  Restrictions/Precautions:    Medical/Treatment Diagnosis Information:  · Diagnosis: M17.12 Primary OA L knee; L97.299 s/p Left TKR DOS 21 at [facility]  · Treatment Diagnosis: M25.562 Left knee pain  Insurance/Certification information:  PT Insurance Information: [address]  Physician Information:  Referring Practitioner: Bhavana Quintanilla MD  Has the plan of care been signed (Y/N):        []  Yes  [x]  No     Date of Patient follow up with Physician: 21 VV      Is this a Progress Report:     []  Yes  [x]  No        If Yes:  Date Range for reporting period:  Beginning 21  Ending     Progress report will be due (10 Rx or 30 days whichever is less):        Recertification will be due (POC Duration  / 90 days whichever is less): 21     Visit # Insurance Allowable Auth Required   In-person 2 60 []  Yes [x]  No    Telehealth     []  Yes []  No    Total          Functional Scale: LEFS: 27/80;66%    Date assessed:  21      Therapy Diagnosis/Practice Pattern: H      Number of Comorbidities:  []0     [x]1-2    []3+     Latex Allergy:  [x]NO      []YES  Preferred Language for Healthcare:   [x]English       []other:      Pain level:  1-2/10     SUBJECTIVE:  Patient reports a low level aching in his L knee. He is only taking aleve and ice for pain. No other issues noted. OBJECTIVE: See eval   Observation: mild swelling, irritation along surgical tape    Test measurements:  KE: lacking 5 deg;  deg with PT OP.      RESTRICTIONS/PRECAUTIONS: L KTR DOS 21    Exercises/Interventions:     [employer] Graham    Therapeutic Ex (32357) Sets/sec Reps Notes/CUES   Quad sets 5\"Hx15      SLR 10x2 activities related to strengthening, flexibility, endurance, ROM of core, proximal hip and LE for functional self-care, mobility, lifting and ambulation/stair navigation   [] (77772)Reviewed/Progressed HEP activities related to improving balance, coordination, kinesthetic sense, posture, motor skill, proprioception of core, proximal hip and LE for self care, mobility, lifting, and ambulation/stair navigation      Manual Treatments:  PROM / STM / Oscillations-Mobs:  G-I, II, III, IV (PA's, Inf., Post.)  [x] (74563) Provided manual therapy to mobilize LE, proximal hip and/or LS spine soft tissue/joints for the purpose of modulating pain, promoting relaxation,  increasing ROM, reducing/eliminating soft tissue swelling/inflammation/restriction, improving soft tissue extensibility and allowing for proper ROM for normal function with self care, mobility, lifting and ambulation. Modalities:     [x] GAME READY (VASO)- for significant edema, swelling, pain control. x15'      Charges:  Timed Code Treatment Minutes: 36'   Total Treatment Minutes: 54'     [facility] time in/time out:   (and requires time in and out for each CPT code)    [] EVAL (LOW) 455 1011   [] EVAL (MOD) 57725  [] EVAL (HIGH) 37126   [] RE-EVAL     [x] LT(04256) x 2    [] IONTO  [] NMR (55839) x     [x] VASO  [x] Manual (17997) x 1     [] Other:  [] TA x      [] Mech Traction (52094)  [] ES(attended) (97444)      [] ES (un) (72969):       GOALS:   Patient stated goal: Patient will be able to return to golf without L knee pain. [] Progressing: [] Met: [] Not Met: [] Adjusted    Therapist goals for Patient:   Short Term Goals: To be achieved in: 2 weeks  1. Independent in HEP and progression per patient tolerance, in order to prevent re-injury. [] Progressing: [] Met: [] Not Met: [] Adjusted  2. Patient will have a decrease in pain to facilitate improvement in movement, function, and ADLs as indicated by Functional Deficits.   [] Progressing: [] Met: [] Not Met: [] Adjusted    Long Term Goals: To be achieved in: 8 weeks  1. Disability index score of 33% or less for the LEFS to assist with reaching prior level of function. [] Progressing: [] Met: [] Not Met: [] Adjusted  2. Patient will demonstrate increased AROM to 125 deg of L KF, 0 deg of L KE to allow for proper joint functioning as indicated by patients Functional Deficits. [] Progressing: [] Met: [] Not Met: [] Adjusted  3. Patient will demonstrate an increase in Strength to good proximal hip strength and control, within 5lb HHD in LE to allow for proper functional mobility as indicated by patients Functional Deficits. [] Progressing: [] Met: [] Not Met: [] Adjusted  4. Patient will return to 95% of functional activities without increased symptoms or restriction. [] Progressing: [] Met: [] Not Met: [] Adjusted  5. Patient will be able to walk his dog without L knee pain(patient specific functional goal)    [] Progressing: [] Met: [] Not Met: [] Adjusted       Progression Towards Functional goals:  [] Patient is progressing as expected towards functional goals listed. [] Progression is slowed due to complexities listed. [] Progression has been slowed due to co-morbidities. [x] Plan just implemented, too soon to assess goals progression  [] Other:         Overall Progression Towards Functional goals/ Treatment Progress Update:  [] Patient is progressing as expected towards functional goals listed. [] Progression is slowed due to complexities/Impairments listed. [] Progression has been slowed due to co-morbidities.   [x] Plan just implemented, too soon to assess goals progression <30days   [] Goals require adjustment due to lack of progress  [] Patient is not progressing as expected and requires additional follow up with physician  [] Other    Prognosis for POC: [x] Good [] Fair  [] Poor      Patient requires continued skilled intervention: [x] Yes  [] No    Treatment/Activity Tolerance:  [x] Patient able to complete treatment  [] Patient limited by fatigue  [] Patient limited by pain    [] Patient limited by other medical complications  [] Other:     ASSESSMENT: Patient is doing well after L TKA on 1/13/21. He was able to achieve 105 deg of L KF and lacking 5 deg of KE after therapy this date. Overall he is doing well with good - quad control on SLR. Patient would benefit from cont skilled PT in order to progress towards full return to ADLs and functional mobility without limitations. Pt has his f/u appt today with the surgeon and was instructed to inquire about the mild skin irritation under his surgical taping. Return to Play: (if applicable)   []  Stage 1: Intro to Strength   []  Stage 2: Return to Run and Strength   []  Stage 3: Return to Jump and Strength   []  Stage 4: Dynamic Strength and Agility   []  Stage 5: Sport Specific Training     []  Ready to Return to Play, Meets All Above Stages   []  Not Ready for Return to Sports   Comments:                               PLAN: See eval. Cont L knee ROM and strength. [x] Continue per plan of care [] Alter current plan (see comments above)  [] Plan of care initiated [] Hold pending MD visit [] Discharge      Electronically signed by:  Oliva Velasco, PT, DPT, OCS [id number]     Note: If patient does not return for scheduled/ recommended follow up visits, this note will serve as a discharge from care along with most recent update on progress.

## 2021-01-29 NOTE — OP NOTE
[facility],  [address]  Phone: [phone]  Fax [phone]    Physical Therapy Treatment Note/ Progress Report:           Date:  2021    Patient Name:  Elvis Quiroz    :  1955  MRN: [id number]  Restrictions/Precautions:    Medical/Treatment Diagnosis Information:  · Diagnosis: M17.12 Primary OA L knee; V52.609 s/p Left TKR DOS 21 at [facility]  · Treatment Diagnosis: M25.562 Left knee pain  Insurance/Certification information:  PT Insurance Information: [employer] [address] [employer]  Physician Information:  Referring Practitioner: Jos Preston MD  Has the plan of care been signed (Y/N):        []  Yes  [x]  No     Date of Patient follow up with Physician: 21 VV      Is this a Progress Report:     []  Yes  [x]  No        If Yes:  Date Range for reporting period:  Beginning 21  Ending     Progress report will be due (10 Rx or 30 days whichever is less):        Recertification will be due (POC Duration  / 90 days whichever is less): 21     Visit # Insurance Allowable Auth Required   In-person 2 60 []  Yes [x]  No    Telehealth     []  Yes []  No    Total          Functional Scale: LEFS: 27/80;66%    Date assessed:  21      Therapy Diagnosis/Practice Pattern: H      Number of Comorbidities:  []0     [x]1-2    []3+     Latex Allergy:  [x]NO      []YES  Preferred Language for Healthcare:   [x]English       []other:      Pain level:  1-2/10     SUBJECTIVE:  Patient reports a low level aching in his L knee. He is only taking aleve and ice for pain. No other issues noted. OBJECTIVE: See eval   Observation: mild swelling, irritation along surgical tape    Test measurements:  KE: lacking 5 deg;  deg with PT OP.      RESTRICTIONS/PRECAUTIONS: L KTR DOS 21      Therapeutic Exercise and NMR EXR  [x] (75533) Provided verbal/tactile cueing for activities related to strengthening, flexibility, endurance, ROM for improvements in LE, proximal hip, and core control with self care, mobility, lifting, ambulation.  [] (57969) Provided verbal/tactile cueing for activities related to improving balance, coordination, kinesthetic sense, posture, motor skill, proprioception  to assist with LE, proximal hip, and core control in self care, mobility, lifting, ambulation and eccentric single leg control. NMR and Therapeutic Activities:    [] (70582 or 39078) Provided verbal/tactile cueing for activities related to improving balance, coordination, kinesthetic sense, posture, motor skill, proprioception and motor activation to allow for proper function of core, proximal hip and LE with self care and ADLs  [] (28976) Gait Re-education- Provided training and instruction to the patient for proper LE, core and proximal hip recruitment and positioning and eccentric body weight control with ambulation re-education including up and down stairs     Home Exercise Program:    [x] (79687) Reviewed/Progressed HEP activities related to strengthening, flexibility, endurance, ROM of core, proximal hip and LE for functional self-care, mobility, lifting and ambulation/stair navigation   [] (10791)Reviewed/Progressed HEP activities related to improving balance, coordination, kinesthetic sense, posture, motor skill, proprioception of core, proximal hip and LE for self care, mobility, lifting, and ambulation/stair navigation      Manual Treatments:  PROM / STM / Oscillations-Mobs:  G-I, II, III, IV (PA's, Inf., Post.)  [x] (16418) Provided manual therapy to mobilize LE, proximal hip and/or LS spine soft tissue/joints for the purpose of modulating pain, promoting relaxation,  increasing ROM, reducing/eliminating soft tissue swelling/inflammation/restriction, improving soft tissue extensibility and allowing for proper ROM for normal function with self care, mobility, lifting and ambulation.      Modalities:     [x] GAME READY (VASO)- for significant edema, swelling, pain control. x15'      Charges:  Timed Code Treatment Minutes: 36'   Total Treatment Minutes: 54'     Omar Vanesa time in/time out:   (and requires time in and out for each CPT code)    [] EVAL (LOW) 455 [id number]   [] EVAL (MOD) 94216  [] EVAL (HIGH) 98080   [] RE-EVAL     [x] SX(96845) x 2    [] IONTO  [] NMR (75280) x     [x] VASO  [x] Manual (12456) x 1     [] Other:  [] TA x      [] Mech Traction (60269)  [] ES(attended) (09284)      [] ES (un) (57332):       GOALS:   Patient stated goal: Patient will be able to return to golf without L knee pain. [] Progressing: [] Met: [] Not Met: [] Adjusted    Therapist goals for Patient:   Short Term Goals: To be achieved in: 2 weeks  1. Independent in HEP and progression per patient tolerance, in order to prevent re-injury. [] Progressing: [] Met: [] Not Met: [] Adjusted  2. Patient will have a decrease in pain to facilitate improvement in movement, function, and ADLs as indicated by Functional Deficits. [] Progressing: [] Met: [] Not Met: [] Adjusted    Long Term Goals: To be achieved in: 8 weeks  1. Disability index score of 33% or less for the LEFS to assist with reaching prior level of function. [] Progressing: [] Met: [] Not Met: [] Adjusted  2. Patient will demonstrate increased AROM to 125 deg of L KF, 0 deg of L KE to allow for proper joint functioning as indicated by patients Functional Deficits. [] Progressing: [] Met: [] Not Met: [] Adjusted  3. Patient will demonstrate an increase in Strength to good proximal hip strength and control, within 5lb HHD in LE to allow for proper functional mobility as indicated by patients Functional Deficits. [] Progressing: [] Met: [] Not Met: [] Adjusted  4. Patient will return to 95% of functional activities without increased symptoms or restriction. [] Progressing: [] Met: [] Not Met: [] Adjusted  5.  Patient will be able to walk his dog without L knee pain(patient specific functional goal)    [] Progressing: [] Met: [] Not Met: [] Adjusted       Progression Towards Functional goals:  [] Patient is progressing as expected towards functional goals listed. [] Progression is slowed due to complexities listed. [] Progression has been slowed due to co-morbidities. [x] Plan just implemented, too soon to assess goals progression  [] Other:         Overall Progression Towards Functional goals/ Treatment Progress Update:  [] Patient is progressing as expected towards functional goals listed. [] Progression is slowed due to complexities/Impairments listed. [] Progression has been slowed due to co-morbidities. [x] Plan just implemented, too soon to assess goals progression <30days   [] Goals require adjustment due to lack of progress  [] Patient is not progressing as expected and requires additional follow up with physician  [] Other    Prognosis for POC: [x] Good [] Fair  [] Poor      Patient requires continued skilled intervention: [x] Yes  [] No    Treatment/Activity Tolerance:  [x] Patient able to complete treatment  [] Patient limited by fatigue  [] Patient limited by pain    [] Patient limited by other medical complications  [] Other:     ASSESSMENT: Patient is doing well after L TKA on 1/13/21. He was able to achieve 105 deg of L KF and lacking 5 deg of KE after therapy this date. Overall he is doing well with good - quad control on SLR. Patient would benefit from cont skilled PT in order to progress towards full return to ADLs and functional mobility without limitations. Pt has his f/u appt today with the surgeon and was instructed to inquire about the mild skin irritation under his surgical taping.      Return to Play: (if applicable)   []  Stage 1: Intro to Strength   []  Stage 2: Return to Run and Strength   []  Stage 3: Return to Jump and Strength   []  Stage 4: Dynamic Strength and Agility   []  Stage 5: Sport Specific Training     []  Ready to Return to Play, Meets All Above Stages   []  Not

## 2021-02-02 ENCOUNTER — HOSPITAL ENCOUNTER (OUTPATIENT)
Dept: PHYSICAL THERAPY | Age: 66
Setting detail: THERAPIES SERIES
Discharge: HOME | End: 2021-02-02
Payer: COMMERCIAL

## 2021-02-02 PROCEDURE — 97016 VASOPNEUMATIC DEVICE THERAPY: CPT

## 2021-02-02 PROCEDURE — 97110 THERAPEUTIC EXERCISES: CPT

## 2021-02-02 PROCEDURE — 97140 MANUAL THERAPY 1/> REGIONS: CPT

## 2021-02-02 NOTE — FLOWSHEET NOTE
[facility], [address] [place]  Phone: [phone]  Fax [phone]    Physical Therapy Treatment Note/ Progress Report:           Date:  2021    Patient Name:  Marylin Persaud    :  1955  MRN: [id number]  Restrictions/Precautions:    Medical/Treatment Diagnosis Information:  · Diagnosis: M17.12 Primary OA L knee; G20.887 s/p Left TKR DOS 21 at [facility]  · Treatment Diagnosis: M25.562 Left knee pain  Insurance/Certification information:  PT Insurance Information: Francia Darlene  Physician Information:  Referring Practitioner: Jasbir Najera MD  Has the plan of care been signed (Y/N):        []  Yes  [x]  No     Date of Patient follow up with Physician: 21 VV      Is this a Progress Report:     []  Yes  [x]  No        If Yes:  Date Range for reporting period:  Beginning 21  Ending     Progress report will be due (10 Rx or 30 days whichever is less):        Recertification will be due (POC Duration  / 90 days whichever is less): 21     Visit # Insurance Allowable Auth Required   In-person 3 60 []  Yes [x]  No    Telehealth     []  Yes []  No    Total          Functional Scale: LEFS: 27/80;66%    Date assessed:  21      Therapy Diagnosis/Practice Pattern: H      Number of Comorbidities:  []0     [x]1-2    []3+     Latex Allergy:  [x]NO      []YES  Preferred Language for Healthcare:   [x]English       []other:      Pain level:  1-2/10     SUBJECTIVE:  Patient reports he had to drive 60 miles for work this morning without much pain or issues in his knee other than tightness. OBJECTIVE: See eval  ? Observation: mild swelling, irritation along surgical tape   ? Test measurements:  KE: lacking 5 deg;  deg with PT OP.      RESTRICTIONS/PRECAUTIONS: L KTR DOS 21    Exercises/Interventions:     [employer]    Therapeutic Ex (54111) Sets/sec Reps Notes/CUES   Quad sets 5\"Hx15 SLR 10x3, 3\"H   Quad lag noted, improved with VC   10\"x10     30\"x3     30\"x3     KF on SB with strap and OP 10\"x10     Slant board calf stretch 30\"x3     SL hip abduction 2x10      Clamshells R/L 2x10 ea           Trustretch HS stretch 30\"x3     Trustretch KF stretch 10\"x10     SB bridges 2x15      KF AAROM EOP 10\"x10     Seated LAQ 3\"Hx20                       Recumbent bike 6'  ROM KE, KF unable to achieve full revolution          Pt ed on current condition, POC, expectations for therapy, HEP, stair negotiation, and safe use of cane, icing every hour                   Manual Intervention (43531)      STM L quads, PFJ mobs, tibfem mobs, PROM with OP KE, KF,  10'                                   NMR re-education (97877)   CUES NEEDED                                                         Therapeutic Activity (94565)                                                Therapeutic Exercise and NMR EXR  [x] (40437) Provided verbal/tactile cueing for activities related to strengthening, flexibility, endurance, ROM for improvements in LE, proximal hip, and core control with self care, mobility, lifting, ambulation. [x] (15257) Provided verbal/tactile cueing for activities related to improving balance, coordination, kinesthetic sense, posture, motor skill, proprioception  to assist with LE, proximal hip, and core control in self care, mobility, lifting, ambulation and eccentric single leg control.      NMR and Therapeutic Activities:    [] (28558 or 28071) Provided verbal/tactile cueing for activities related to improving balance, coordination, kinesthetic sense, posture, motor skill, proprioception and motor activation to allow for proper function of core, proximal hip and LE with self care and ADLs 1. Independent in HEP and progression per patient tolerance, in order to prevent re-injury. [] Progressing: [] Met: [] Not Met: [] Adjusted  2. Patient will have a decrease in pain to facilitate improvement in movement, function, and ADLs as indicated by Functional Deficits. [] Progressing: [] Met: [] Not Met: [] Adjusted    Long Term Goals: To be achieved in: 8 weeks  1. Disability index score of 33% or less for the LEFS to assist with reaching prior level of function. [] Progressing: [] Met: [] Not Met: [] Adjusted  2. Patient will demonstrate increased AROM to 125 deg of L KF, 0 deg of L KE to allow for proper joint functioning as indicated by patients Functional Deficits. [] Progressing: [] Met: [] Not Met: [] Adjusted  3. Patient will demonstrate an increase in Strength to good proximal hip strength and control, within 5lb HHD in LE to allow for proper functional mobility as indicated by patients Functional Deficits. [] Progressing: [] Met: [] Not Met: [] Adjusted  4. Patient will return to 95% of functional activities without increased symptoms or restriction. [] Progressing: [] Met: [] Not Met: [] Adjusted  5. Patient will be able to walk his dog without L knee pain(patient specific functional goal)    [] Progressing: [] Met: [] Not Met: [] Adjusted       Progression Towards Functional goals:  [] Patient is progressing as expected towards functional goals listed. [] Progression is slowed due to complexities listed. [] Progression has been slowed due to co-morbidities. [x] Plan just implemented, too soon to assess goals progression  [] Other:         Overall Progression Towards Functional goals/ Treatment Progress Update:  [] Patient is progressing as expected towards functional goals listed. [] Progression is slowed due to complexities/Impairments listed. [] Progression has been slowed due to co-morbidities.   [x] Plan just implemented, too soon to assess goals progression <30days [] Goals require adjustment due to lack of progress  [] Patient is not progressing as expected and requires additional follow up with physician  [] Other    Prognosis for POC: [x] Good [] Fair  [] Poor      Patient requires continued skilled intervention: [x] Yes  [] No    Treatment/Activity Tolerance:  [x] Patient able to complete treatment  [] Patient limited by fatigue  [] Patient limited by pain    [] Patient limited by other medical complications  [] Other:     ASSESSMENT: Patient is doing well after L TKA on 1/13/21. He is progressing well with ROM and quad strength but has continued limitations in proximal hip strength and impaired gait mechanics. Patient would benefit from cont skilled PT in order to progress towards full return to ADLs and functional mobility without limitations. Pt has his f/u appt today with the surgeon and was instructed to inquire about the mild skin irritation under his surgical taping. Return to Play: (if applicable)   []  Stage 1: Intro to Strength   []  Stage 2: Return to Run and Strength   []  Stage 3: Return to Jump and Strength   []  Stage 4: Dynamic Strength and Agility   []  Stage 5: Sport Specific Training     []  Ready to Return to Play, Meets All Above Stages   []  Not Ready for Return to Sports   Comments:                               PLAN: See eval. Cont L knee ROM and strength. Gait retraining NV. [x] Continue per plan of care [] Alter current plan (see comments above)  [] Plan of care initiated [] Hold pending MD visit [] Discharge      Electronically signed by:  Mary Campos, PT, DPT, OCS [id number]     Note: If patient does not return for scheduled/ recommended follow up visits, this note will serve as a discharge from care along with most recent update on progress.

## 2021-02-05 ENCOUNTER — HOSPITAL ENCOUNTER (OUTPATIENT)
Dept: PHYSICAL THERAPY | Age: 66
Setting detail: THERAPIES SERIES
Discharge: HOME | End: 2021-02-05
Payer: COMMERCIAL

## 2021-02-05 PROCEDURE — 97016 VASOPNEUMATIC DEVICE THERAPY: CPT

## 2021-02-05 PROCEDURE — 97112 NEUROMUSCULAR REEDUCATION: CPT

## 2021-02-05 PROCEDURE — 97110 THERAPEUTIC EXERCISES: CPT

## 2021-02-05 PROCEDURE — 97140 MANUAL THERAPY 1/> REGIONS: CPT

## 2021-02-05 NOTE — FLOWSHEET NOTE
[facility],  [address]  Phone: [phone]  Fax [phone]    Physical Therapy Treatment Note/ Progress Report:           Date:  2021    Patient Name:  Fazal Galloway    :  1955  MRN: [id number]  Restrictions/Precautions:    Medical/Treatment Diagnosis Information:  · Diagnosis: M17.12 Primary OA L knee; D72.551 s/p Left TKR DOS 21 at [facility]  · Treatment Diagnosis: M25.562 Left knee pain  Insurance/Certification information:  PT Insurance Information: [facility]  Physician Information:  Referring Practitioner: Tre De Guzman MD  Has the plan of care been signed (Y/N):        []  Yes  [x]  No     Date of Patient follow up with Physician: 21 VV      Is this a Progress Report:     []  Yes  [x]  No        If Yes:  Date Range for reporting period:  Beginning 21  Ending     Progress report will be due (10 Rx or 30 days whichever is less):        Recertification will be due (POC Duration  / 90 days whichever is less): 21     Visit # Insurance Allowable Auth Required   In-person 4 60 []  Yes [x]  No    Telehealth     []  Yes []  No    Total          Functional Scale: LEFS: 27/80;66%    Date assessed:  21      Therapy Diagnosis/Practice Pattern: H      Number of Comorbidities:  []0     [x]1-2    []3+     Latex Allergy:  [x]NO      []YES  Preferred Language for Healthcare:   [x]English       []other:      Pain level:  1-2/10     SUBJECTIVE:  Patient reports he had to drive 60 miles for work this morning without much pain or issues in his knee other than tightness. OBJECTIVE: See eval   Observation: mild swelling under PFJ, good patellar mobility    Test measurements:  KE: lacking 5 deg;  deg with PT OP.      RESTRICTIONS/PRECAUTIONS: L KTR DOS 21    Exercises/Interventions:     MedbridgeDorisann Pawan    Therapeutic Ex (63711) Sets/sec Reps Notes/CUES   Quad sets 5\"Hx15 SLR 10x3, 3\"H   Improved quad activation; form improves with VC    10\"x10     30\"x3     30\"x3     KF on SB with strap and OP 10\"x10     Slant board calf stretch 30\"x3     2x10      2x10 ea           Trustretch HS stretch 30\"x3     Trustretch KF stretch 10\"x10     SB bridges 2x15      10\"x10     3\"Hx20     Reverse clamshells R/L 2x15 ea                 Recumbent bike 6'  ROM KE, KF unable to achieve full revolution          Pt ed on current condition, POC, expectations for therapy, HEP, stair negotiation, and safe use of cane, icing every hour                   Manual Intervention (70210)      STM L quads, PFJ mobs, tibfem mobs, PROM with OP KE, KF,  10'     tibfib mobs for KF                              NMR re-education (41549)   CUES NEEDED   Stair retraining 5'  VC for technique    FSU 6\" R/L 3x10 ea                                               Therapeutic Activity (04112)                                                Therapeutic Exercise and NMR EXR  [x] (41895) Provided verbal/tactile cueing for activities related to strengthening, flexibility, endurance, ROM for improvements in LE, proximal hip, and core control with self care, mobility, lifting, ambulation. [x] (78327) Provided verbal/tactile cueing for activities related to improving balance, coordination, kinesthetic sense, posture, motor skill, proprioception  to assist with LE, proximal hip, and core control in self care, mobility, lifting, ambulation and eccentric single leg control.      NMR and Therapeutic Activities:    [] (52006 or 30758) Provided verbal/tactile cueing for activities related to improving balance, coordination, kinesthetic sense, posture, motor skill, proprioception and motor activation to allow for proper function of core, proximal hip and LE with self care and ADLs  [] (67724) Gait Re-education- Provided training and instruction to the patient for proper LE, core and proximal hip recruitment and positioning and eccentric body weight control with ambulation re-education including up and down stairs     Home Exercise Program:    [x] (51547) Reviewed/Progressed HEP activities related to strengthening, flexibility, endurance, ROM of core, proximal hip and LE for functional self-care, mobility, lifting and ambulation/stair navigation   [] (35408)Reviewed/Progressed HEP activities related to improving balance, coordination, kinesthetic sense, posture, motor skill, proprioception of core, proximal hip and LE for self care, mobility, lifting, and ambulation/stair navigation      Manual Treatments:  PROM / STM / Oscillations-Mobs:  G-I, II, III, IV (PA's, Inf., Post.)  [x] (06718) Provided manual therapy to mobilize LE, proximal hip and/or LS spine soft tissue/joints for the purpose of modulating pain, promoting relaxation,  increasing ROM, reducing/eliminating soft tissue swelling/inflammation/restriction, improving soft tissue extensibility and allowing for proper ROM for normal function with self care, mobility, lifting and ambulation. Modalities:     [x] GAME READY (VASO)- for significant edema, swelling, pain control. x10'      Charges:  Timed Code Treatment Minutes: 39'   Total Treatment Minutes: 54'     [facility] time in/time out:   (and requires time in and out for each CPT code)    [] EVAL (LOW) 455 1011   [] EVAL (MOD) 40231  [] EVAL (HIGH) 28565   [] RE-EVAL     [x] NM(70016) x 1    [] IONTO  [x] NMR (35711) x 1    [x] VASO  [x] Manual (76611) x 1     [] Other:  [] TA x      [] Mech Traction (08490)  [] ES(attended) (00209)      [] ES (un) (53440):        GOALS:   Patient stated goal: Patient will be able to return to golf without L knee pain. [] Progressing: [] Met: [] Not Met: [] Adjusted    Therapist goals for Patient:   Short Term Goals: To be achieved in: 2 weeks  1. Independent in HEP and progression per patient tolerance, in order to prevent re-injury. [] Progressing: [] Met: [] Not Met: [] Adjusted  2.  Patient will have a decrease in pain to facilitate improvement in movement, function, and ADLs as indicated by Functional Deficits. [] Progressing: [] Met: [] Not Met: [] Adjusted    Long Term Goals: To be achieved in: 8 weeks  1. Disability index score of 33% or less for the LEFS to assist with reaching prior level of function. [] Progressing: [] Met: [] Not Met: [] Adjusted  2. Patient will demonstrate increased AROM to 125 deg of L KF, 0 deg of L KE to allow for proper joint functioning as indicated by patients Functional Deficits. [] Progressing: [] Met: [] Not Met: [] Adjusted  3. Patient will demonstrate an increase in Strength to good proximal hip strength and control, within 5lb HHD in LE to allow for proper functional mobility as indicated by patients Functional Deficits. [] Progressing: [] Met: [] Not Met: [] Adjusted  4. Patient will return to 95% of functional activities without increased symptoms or restriction. [] Progressing: [] Met: [] Not Met: [] Adjusted  5. Patient will be able to walk his dog without L knee pain(patient specific functional goal)    [] Progressing: [] Met: [] Not Met: [] Adjusted       Progression Towards Functional goals:  [] Patient is progressing as expected towards functional goals listed. [] Progression is slowed due to complexities listed. [] Progression has been slowed due to co-morbidities. [x] Plan just implemented, too soon to assess goals progression  [] Other:         Overall Progression Towards Functional goals/ Treatment Progress Update:  [] Patient is progressing as expected towards functional goals listed. [] Progression is slowed due to complexities/Impairments listed. [] Progression has been slowed due to co-morbidities.   [x] Plan just implemented, too soon to assess goals progression <30days   [] Goals require adjustment due to lack of progress  [] Patient is not progressing as expected and requires additional follow up with physician  [] Other    Prognosis for POC: [x] Good [] Fair  [] Poor      Patient requires continued skilled intervention: [x] Yes  [] No    Treatment/Activity Tolerance:  [x] Patient able to complete treatment  [] Patient limited by fatigue  [] Patient limited by pain    [] Patient limited by other medical complications  [] Other:     ASSESSMENT: Patient is doing well after L TKA on 1/13/21. He is progressing well overall but still remains limited in full KF and KE. He was re-educated on importance of compliance with his HEP in order to see further progress with ROM and strengthening. Patient would benefit from cont skilled PT in order to progress towards full return to ADLs and functional mobility without limitations. Pt has his f/u appt today with the surgeon and was instructed to inquire about the mild skin irritation under his surgical taping. Return to Play: (if applicable)   []  Stage 1: Intro to Strength   []  Stage 2: Return to Run and Strength   []  Stage 3: Return to Jump and Strength   []  Stage 4: Dynamic Strength and Agility   []  Stage 5: Sport Specific Training     []  Ready to Return to Play, Meets All Above Stages   []  Not Ready for Return to Sports   Comments:                               PLAN: See eval. Cont L knee ROM and strength. Gait retraining NV. [x] Continue per plan of care [] Alter current plan (see comments above)  [] Plan of care initiated [] Hold pending MD visit [] Discharge      Electronically signed by:  Cristobal Thomas, PT, DPT, [facility] [id number]     Note: If patient does not return for scheduled/ recommended follow up visits, this note will serve as a discharge from care along with most recent update on progress.

## 2021-02-09 ENCOUNTER — HOSPITAL ENCOUNTER (OUTPATIENT)
Dept: PHYSICAL THERAPY | Age: 66
Setting detail: THERAPIES SERIES
Discharge: HOME | End: 2021-02-09
Payer: COMMERCIAL

## 2021-02-09 PROCEDURE — 97140 MANUAL THERAPY 1/> REGIONS: CPT

## 2021-02-09 PROCEDURE — 97110 THERAPEUTIC EXERCISES: CPT

## 2021-02-09 PROCEDURE — 97016 VASOPNEUMATIC DEVICE THERAPY: CPT

## 2021-02-09 NOTE — FLOWSHEET NOTE
[facility], [address]  Phone: [phone]  Fax [phone]    Physical Therapy Treatment Note/ Progress Report:           Date:  2021    Patient Name:  Stephani Siddiqui    :  1955  MRN: [id number]  Restrictions/Precautions:    Medical/Treatment Diagnosis Information:  · Diagnosis: M17.12 Primary OA L knee; M30.928 s/p Left TKR DOS 21 at [facility]  · Treatment Diagnosis: M25.562 Left knee pain  Insurance/Certification information:  PT Insurance Information: [employer]  Physician Information:  Referring Practitioner: Berkley Esqueda MD  Has the plan of care been signed (Y/N):        []  Yes  [x]  No     Date of Patient follow up with Physician: 21 VV      Is this a Progress Report:     []  Yes  [x]  No        If Yes:  Date Range for reporting period:  Beginning 21  Ending     Progress report will be due (10 Rx or 30 days whichever is less):        Recertification will be due (POC Duration  / 90 days whichever is less): 21     Visit # Insurance Allowable Auth Required   In-person 5 60 []  Yes [x]  No    Telehealth     []  Yes []  No    Total          Functional Scale: LEFS: 27/80;66%    Date assessed:  21      Therapy Diagnosis/Practice Pattern: H      Number of Comorbidities:  []0     [x]1-2    []3+     Latex Allergy:  [x]NO      []YES  Preferred Language for Healthcare:   [x]English       []other:      Pain level:  1-2/10     SUBJECTIVE:  Patient reports he has gone to the gym to use the recumbent bike there. He also reports using the leg press there. OBJECTIVE: See eval  ? Observation: continued swelling under PFJ, good patellar mobility    ?  Test measurements:  21- KE: lacking 1-2 deg;  deg AROM     RESTRICTIONS/PRECAUTIONS: L KTR DOS 21    Exercises/Interventions:     Medbridge: [id number]    Therapeutic Ex (92140) Sets/sec Reps Notes/CUES   Quad sets 7\"EY61 SLR 15x2, 3\"H   Improved quad activation; form improves with VC    10\"x10     30\"x3     30\"x3     KF on SB with strap and OP 10\"x10     Slant board calf stretch 30\"x3     2x10      2x10 ea           Trustretch HS stretch 30\"x3     Trustretch KF stretch 10\"x10      2x15      KF AAROM EOP10\"x10     3\"Hx20     L 2x15 ea     LP: DL 2x15   85#   2 up/1 down  2x15   65#   LORNA: TKE, HAB 2x15   45#                     Recumbent bike 6'  ROM KE, KF unable to achieve full revolution          Pt ed on current condition, POC, expectations for therapy, HEP, stair negotiation, and safe use of cane, icing every hour                   Manual Intervention (88292)      STM L quads, PFJ mobs, tibfem mobs, PROM with OP KE, KF,  10'     tibfib mobs for KF 2'     Seated KF MWM 10x   Improved mobility following                     NMR re-education (09448)   CUES NEEDED   5'  VC for technique    3x10 ea                                               Therapeutic Activity (47004)                                                Therapeutic Exercise and NMR EXR  [x] (58157) Provided verbal/tactile cueing for activities related to strengthening, flexibility, endurance, ROM for improvements in LE, proximal hip, and core control with self care, mobility, lifting, ambulation. [x] (94488) Provided verbal/tactile cueing for activities related to improving balance, coordination, kinesthetic sense, posture, motor skill, proprioception  to assist with LE, proximal hip, and core control in self care, mobility, lifting, ambulation and eccentric single leg control.      NMR and Therapeutic Activities:    [] (87827 or 10309) Provided verbal/tactile cueing for activities related to improving balance, coordination, kinesthetic sense, posture, motor skill, proprioception and motor activation to allow for proper function of core, proximal hip and LE with self care and ADLs [] (76233) Gait Re-education- Provided training and instruction to the patient for proper LE, core and proximal hip recruitment and positioning and eccentric body weight control with ambulation re-education including up and down stairs     Home Exercise Program:    [x] (30599) Reviewed/Progressed HEP activities related to strengthening, flexibility, endurance, ROM of core, proximal hip and LE for functional self-care, mobility, lifting and ambulation/stair navigation   [] (03003)Reviewed/Progressed HEP activities related to improving balance, coordination, kinesthetic sense, posture, motor skill, proprioception of core, proximal hip and LE for self care, mobility, lifting, and ambulation/stair navigation      Manual Treatments:  PROM / STM / Oscillations-Mobs:  G-I, II, III, IV (PA's, Inf., Post.)  [x] (16221) Provided manual therapy to mobilize LE, proximal hip and/or LS spine soft tissue/joints for the purpose of modulating pain, promoting relaxation,  increasing ROM, reducing/eliminating soft tissue swelling/inflammation/restriction, improving soft tissue extensibility and allowing for proper ROM for normal function with self care, mobility, lifting and ambulation. Modalities:     [x] GAME READY (VASO)- for significant edema, swelling, pain control. x10'      Charges:  Timed Code Treatment Minutes: 39'   Total Treatment Minutes: 54'     [facility] time in/time out:   (and requires time in and out for each CPT code)    [] EVAL (LOW) 455 1011   [] EVAL (MOD) 92160  [] EVAL (HIGH) 10022   [] RE-EVAL     [x] GE(39877) x 2    [] IONTO  [] NMR (72101) x     [x] VASO  [x] Manual (63562) x 1     [] Other:  [] TA x      [] Mech Traction (27991)  [] ES(attended) (14789)      [] ES (un) (70051):        GOALS:   Patient stated goal: Patient will be able to return to golf without L knee pain. [] Progressing: [] Met: [] Not Met: [] Adjusted    Therapist goals for Patient:   Short Term Goals:  To be achieved in: 2 weeks 1. Independent in HEP and progression per patient tolerance, in order to prevent re-injury. [] Progressing: [] Met: [] Not Met: [] Adjusted  2. Patient will have a decrease in pain to facilitate improvement in movement, function, and ADLs as indicated by Functional Deficits. [] Progressing: [] Met: [] Not Met: [] Adjusted    Long Term Goals: To be achieved in: 8 weeks  1. Disability index score of 33% or less for the LEFS to assist with reaching prior level of function. [] Progressing: [] Met: [] Not Met: [] Adjusted  2. Patient will demonstrate increased AROM to 125 deg of L KF, 0 deg of L KE to allow for proper joint functioning as indicated by patients Functional Deficits. [] Progressing: [] Met: [] Not Met: [] Adjusted  3. Patient will demonstrate an increase in Strength to good proximal hip strength and control, within 5lb HHD in LE to allow for proper functional mobility as indicated by patients Functional Deficits. [] Progressing: [] Met: [] Not Met: [] Adjusted  4. Patient will return to 95% of functional activities without increased symptoms or restriction. [] Progressing: [] Met: [] Not Met: [] Adjusted  5. Patient will be able to walk his dog without L knee pain(patient specific functional goal)    [] Progressing: [] Met: [] Not Met: [] Adjusted       Progression Towards Functional goals:  [] Patient is progressing as expected towards functional goals listed. [] Progression is slowed due to complexities listed. [] Progression has been slowed due to co-morbidities. [x] Plan just implemented, too soon to assess goals progression  [] Other:         Overall Progression Towards Functional goals/ Treatment Progress Update:  [] Patient is progressing as expected towards functional goals listed. [] Progression is slowed due to complexities/Impairments listed. [] Progression has been slowed due to co-morbidities.   [x] Plan just implemented, too soon to assess goals progression <30days [] Goals require adjustment due to lack of progress  [] Patient is not progressing as expected and requires additional follow up with physician  [] Other    Prognosis for POC: [x] Good [] Fair  [] Poor      Patient requires continued skilled intervention: [x] Yes  [] No    Treatment/Activity Tolerance:  [x] Patient able to complete treatment  [] Patient limited by fatigue  [] Patient limited by pain    [] Patient limited by other medical complications  [] Other:     ASSESSMENT: Patient is doing well after L TKA on 1/13/21. This session he achieved improved KF and KE and did well with additional challenges for quad and hip strengthening. He was re-educated on importance of compliance with his HEP in order to see further progress with ROM and strengthening. Patient would benefit from cont skilled PT in order to progress towards full return to ADLs and functional mobility without limitations. Return to Play: (if applicable)   []  Stage 1: Intro to Strength   []  Stage 2: Return to Run and Strength   []  Stage 3: Return to Jump and Strength   []  Stage 4: Dynamic Strength and Agility   []  Stage 5: Sport Specific Training     []  Ready to Return to Play, Meets All Above Stages   []  Not Ready for Return to Sports   Comments:                               PLAN: See eval. Cont L knee ROM and strength. Gait retraining NV. [x] Continue per plan of care [] Alter current plan (see comments above)  [] Plan of care initiated [] Hold pending MD visit [] Discharge      Electronically signed by:  Aline Wu, PT, DPT, [facility] [id number]     Note: If patient does not return for scheduled/ recommended follow up visits, this note will serve as a discharge from care along with most recent update on progress.

## 2021-02-12 ENCOUNTER — HOSPITAL ENCOUNTER (OUTPATIENT)
Dept: PHYSICAL THERAPY | Age: 66
Setting detail: THERAPIES SERIES
Discharge: HOME | End: 2021-02-12
Payer: COMMERCIAL

## 2021-02-12 PROCEDURE — 97110 THERAPEUTIC EXERCISES: CPT

## 2021-02-12 PROCEDURE — 97016 VASOPNEUMATIC DEVICE THERAPY: CPT

## 2021-02-12 PROCEDURE — 97140 MANUAL THERAPY 1/> REGIONS: CPT

## 2021-02-12 PROCEDURE — 97112 NEUROMUSCULAR REEDUCATION: CPT

## 2021-02-12 NOTE — FLOWSHEET NOTE
[facility],  [address] [place]  Phone: [phone]  Fax [phone]    Physical Therapy Treatment Note/ Progress Report:           Date:  2021    Patient Name:  Rica Irving    :  1955  MRN: [id number]  Restrictions/Precautions:    Medical/Treatment Diagnosis Information:  · Diagnosis: M17.12 Primary OA L knee; J67.871 s/p Left TKR DOS 21 at [facility]  · Treatment Diagnosis: M25.562 Left knee pain  Insurance/Certification information:  PT Insurance Information: [employer]  Physician Information:  Referring Practitioner: Harpreet Golden MD  Has the plan of care been signed (Y/N):        []  Yes  [x]  No     Date of Patient follow up with Physician: 21 VV      Is this a Progress Report:     []  Yes  [x]  No        If Yes:  Date Range for reporting period:  Beginning 21  Ending     Progress report will be due (10 Rx or 30 days whichever is less):        Recertification will be due (POC Duration  / 90 days whichever is less): 21     Visit # Insurance Allowable Auth Required   In-person 6 60 []  Yes [x]  No    Telehealth     []  Yes []  No    Total          Functional Scale: LEFS: 27/80;66%    Date assessed:  21      Therapy Diagnosis/Practice Pattern: H      Number of Comorbidities:  []0     [x]1-2    []3+     Latex Allergy:  [x]NO      []YES  Preferred Language for Healthcare:   [x]English       []other:      Pain level:  1-2/10     SUBJECTIVE:  Patient notes stiffness in his knee after working and driving in his car a lot the last 2 days. OBJECTIVE: See eval  ? Observation: continued swelling under PFJ, good patellar mobility    ?  Test measurements:  21- KE: lacking 1-2 deg;  deg AROM     RESTRICTIONS/PRECAUTIONS: L KTR DOS 21    Exercises/Interventions:     Medbridge: [id number]    Therapeutic Ex (02463) Sets/sec Reps Notes/CUES   Quad sets 7\"BQ58 [] (30917) Gait Re-education- Provided training and instruction to the patient for proper LE, core and proximal hip recruitment and positioning and eccentric body weight control with ambulation re-education including up and down stairs     Home Exercise Program:    [x] (96345) Reviewed/Progressed HEP activities related to strengthening, flexibility, endurance, ROM of core, proximal hip and LE for functional self-care, mobility, lifting and ambulation/stair navigation   [] (52917)Reviewed/Progressed HEP activities related to improving balance, coordination, kinesthetic sense, posture, motor skill, proprioception of core, proximal hip and LE for self care, mobility, lifting, and ambulation/stair navigation      Manual Treatments:  PROM / STM / Oscillations-Mobs:  G-I, II, III, IV (PA's, Inf., Post.)  [x] (26399) Provided manual therapy to mobilize LE, proximal hip and/or LS spine soft tissue/joints for the purpose of modulating pain, promoting relaxation,  increasing ROM, reducing/eliminating soft tissue swelling/inflammation/restriction, improving soft tissue extensibility and allowing for proper ROM for normal function with self care, mobility, lifting and ambulation. Modalities:     [x] GAME READY (VASO)- for significant edema, swelling, pain control. x10'      Charges:  Timed Code Treatment Minutes: 36'   Total Treatment Minutes: 48'     [facility] time in/time out:   (and requires time in and out for each CPT code)    [] EVAL (LOW) 455 1011   [] EVAL (MOD) 52779  [] EVAL (HIGH) 24962   [] RE-EVAL     [x] TW(89137) x 1    [] IONTO  [x] NMR (02590) x 1    [x] VASO  [x] Manual (56172) x 1     [] Other:  [] TA x      [] Mech Traction (71692)  [] ES(attended) (23203)      [] ES (un) (47783):        GOALS:   Patient stated goal: Patient will be able to return to golf without L knee pain. [] Progressing: [] Met: [] Not Met: [] Adjusted    Therapist goals for Patient:   Short Term Goals:  To be achieved in: 2 weeks [] Goals require adjustment due to lack of progress  [] Patient is not progressing as expected and requires additional follow up with physician  [] Other    Prognosis for POC: [x] Good [] Fair  [] Poor      Patient requires continued skilled intervention: [x] Yes  [] No    Treatment/Activity Tolerance:  [x] Patient able to complete treatment  [] Patient limited by fatigue  [] Patient limited by pain    [] Patient limited by other medical complications  [] Other:     ASSESSMENT: Patient is doing well after L TKA on 1/13/21. Greater focus was on balance and restoring TKE for improved gait mechanics. He was re-educated on importance of compliance with his HEP in order to see further progress with ROM and strengthening. Patient would benefit from cont skilled PT in order to progress towards full return to ADLs and functional mobility without limitations. Return to Play: (if applicable)   []  Stage 1: Intro to Strength   []  Stage 2: Return to Run and Strength   []  Stage 3: Return to Jump and Strength   []  Stage 4: Dynamic Strength and Agility   []  Stage 5: Sport Specific Training     []  Ready to Return to Play, Meets All Above Stages   []  Not Ready for Return to Sports   Comments:                               PLAN: See eval. Cont L knee ROM and strength. Continue Gait retraining NV. [x] Continue per plan of care [] Alter current plan (see comments above)  [] Plan of care initiated [] Hold pending MD visit [] Discharge      Electronically signed by:  Shun Alexis, PT, DPT, [facility] [id number]     Note: If patient does not return for scheduled/ recommended follow up visits, this note will serve as a discharge from care along with most recent update on progress.

## 2021-02-16 ENCOUNTER — HOSPITAL ENCOUNTER (OUTPATIENT)
Dept: PHYSICAL THERAPY | Age: 66
Setting detail: THERAPIES SERIES
Discharge: HOME | End: 2021-02-16
Payer: COMMERCIAL

## 2021-02-16 PROCEDURE — 97140 MANUAL THERAPY 1/> REGIONS: CPT

## 2021-02-16 PROCEDURE — 97110 THERAPEUTIC EXERCISES: CPT

## 2021-02-16 PROCEDURE — 97112 NEUROMUSCULAR REEDUCATION: CPT

## 2021-02-16 PROCEDURE — 97016 VASOPNEUMATIC DEVICE THERAPY: CPT

## 2021-02-16 NOTE — FLOWSHEET NOTE
[facility],  [address]  Phone: [phone]  Fax [phone]    Physical Therapy Treatment Note/ Progress Report:           Date:  2021    Patient Name:  Kirsty Mauricio    :  1955  MRN: [id number]  Restrictions/Precautions:    Medical/Treatment Diagnosis Information:  · Diagnosis: M17.12 Primary OA L knee; I11.767 s/p Left TKR DOS 21 at [facility]  · Treatment Diagnosis: M25.562 Left knee pain  Insurance/Certification information:  PT Insurance Information: [employer]  Physician Information:  Referring Practitioner: Renny Walden MD  Has the plan of care been signed (Y/N):        []  Yes  [x]  No     Date of Patient follow up with Physician: 21 VV      Is this a Progress Report:     []  Yes  [x]  No        If Yes:  Date Range for reporting period:  Beginning 21  Ending     Progress report will be due (10 Rx or 30 days whichever is less):        Recertification will be due (POC Duration  / 90 days whichever is less): 21     Visit # Insurance Allowable Auth Required   In-person 7 60 []  Yes [x]  No    Telehealth     []  Yes []  No    Total          Functional Scale: LEFS: 27/80;66%    Date assessed:  21      Therapy Diagnosis/Practice Pattern: H      Number of Comorbidities:  []0     [x]1-2    []3+     Latex Allergy:  [x]NO      []YES  Preferred Language for Healthcare:   [x]English       []other:      Pain level:  1-2/10     SUBJECTIVE:  Patient notes no significant changes in his L knee since last session. OBJECTIVE: See eval  ? Observation: continued swelling under PFJ, good patellar mobility    ?  Test measurements:  21- KE: lacking 1-2 deg;  deg AROM     RESTRICTIONS/PRECAUTIONS: L TKR DOS 21    Exercises/Interventions:     Medbridge: [email or web address]    Therapeutic Ex (99891) Sets/sec Reps Notes/CUES   5\"Hx15 Chief complaint:   Chief Complaint   Patient presents with   • Derm Problem     Reaction to a bubble bath at least 2 weeks ago that went away completly.  No changes in personal care products, today is the 4th day today with red linear rash on tummy creases and right armpit crease, isn't itching the area, it is dry and scaley per mom, has tried benadry cream for a few days now.       Vitals:  Visit Vitals   • Pulse 120   • Temp 98.2 °F (36.8 °C) (Temporal Artery)   • Resp 38   • Ht 25.59\" (65 cm)   • Wt 6.369 kg   • SpO2 99%   • BMI 15.07 kg/m2       HISTORY OF PRESENT ILLNESS     Derm Problem   This is a new problem. The current episode started in the past 7 days. The problem occurs constantly. The problem has been unchanged. Associated symptoms include a rash. Pertinent negatives include no congestion, coughing, diaphoresis, fever or vomiting. Associated symptoms comments: Ant. Belly  Maybe pectoral slight. Nothing aggravates the symptoms. Treatments tried: benadry cream. The treatment provided no relief.       Other significant problems:  There are no active problems to display for this patient.      PAST MEDICAL, FAMILY AND SOCIAL HISTORY     Medications:  Current Outpatient Prescriptions   Medication   • diphenhydrAMINE-zinc acetate (BENADRYL) 1-0.1 % cream     No current facility-administered medications for this visit.        Allergies:  ALLERGIES:  No Known Allergies    Past Medical  History/Surgeries:  No past medical history on file.    No past surgical history on file.    Family History:  No family history on file.    Social History:  Social History   Substance Use Topics   • Smoking status: Never Smoker   • Smokeless tobacco: Not on file   • Alcohol use Not on file       REVIEW OF SYSTEMS     Review of Systems   Constitutional: Negative.  Negative for activity change, appetite change, crying, decreased responsiveness, diaphoresis, fever and irritability.   HENT: Negative for congestion, drooling, ear  15x2, 3\"H   Improved quad activation; form improves with VC    10\"x10     30\"x3     30\"x3      10\"x10     Slant board calf stretch 30\"x3     2x10      2x10 ea           Trustretch HS stretch 30\"x3     Trustretch KF stretch 30\"x3      2x15      10\"x10     Seated LAQ + hip add3\"Hx20  3#   L 2x15 ea     2x15   100#   2x15   80#   2x15   60#                     Recumbent bike 6'  ROM KE, KF able to achieve full revolution this date         Pt ed on current condition, POC, expectations for therapy, HEP, stair negotiation, and safe use of cane, icing every hour                   Manual Intervention (40258)      STM L quads, PFJ mobs, tibfem mobs, PROM with OP KE, KF,  8'     tibfib mobs for KF 2'     Seated KF MWM 10x   Improved mobility following                     NMR re-education (92462)   CUES NEEDED   Stair retraining5'  VC for technique    FSU 6\" R/L2x15 ea     FSD 4\" L LE 2x15  VC for form and eccentric control     10'x5  30#    2x15                             Therapeutic Activity (63538)                                                Therapeutic Exercise and NMR EXR  [x] (32136) Provided verbal/tactile cueing for activities related to strengthening, flexibility, endurance, ROM for improvements in LE, proximal hip, and core control with self care, mobility, lifting, ambulation. [x] (16926) Provided verbal/tactile cueing for activities related to improving balance, coordination, kinesthetic sense, posture, motor skill, proprioception  to assist with LE, proximal hip, and core control in self care, mobility, lifting, ambulation and eccentric single leg control.      NMR and Therapeutic Activities:    [] (47595 or 17362) Provided verbal/tactile cueing for activities related to improving balance, coordination, kinesthetic sense, posture, motor skill, proprioception and motor activation to allow for proper function of core, proximal hip and LE with self care and ADLs discharge, mouth sores, rhinorrhea, sneezing and trouble swallowing.    Eyes: Negative for discharge and redness.   Respiratory: Negative for apnea, cough, choking and wheezing.    Cardiovascular: Negative for cyanosis.   Gastrointestinal: Negative for abdominal distention, constipation, diarrhea and vomiting.   Genitourinary: Negative for decreased urine volume.   Skin: Positive for rash. Negative for color change and pallor.       PHYSICAL EXAM     Physical Exam   Constitutional: She appears well-developed and well-nourished. She is active. No distress.   HENT:   Right Ear: Tympanic membrane normal.   Left Ear: Tympanic membrane normal.   Nose: Nose normal. No nasal discharge.   Mouth/Throat: Mucous membranes are moist. Oropharynx is clear. Pharynx is normal.   Eyes: Conjunctivae are normal. Pupils are equal, round, and reactive to light. Right eye exhibits no discharge. Left eye exhibits no discharge.   Neck: Normal range of motion. Neck supple.   Cardiovascular: Normal rate and regular rhythm.  Pulses are strong.    Pulmonary/Chest: Breath sounds normal. No nasal flaring. Tachypnea noted. No respiratory distress. She has no wheezes. She has no rhonchi. She has no rales. She exhibits no retraction.   Abdominal: Soft. Bowel sounds are normal. She exhibits no distension and no mass. There is no tenderness. There is no guarding.       Musculoskeletal: Normal range of motion.   Lymphadenopathy: No occipital adenopathy is present.     She has no cervical adenopathy.   Neurological: She is alert. She exhibits normal muscle tone.   Skin: Skin is warm and dry. Capillary refill takes less than 3 seconds. Turgor is turgor normal. No rash noted. She is not diaphoretic. No cyanosis. No pallor.   Nursing note and vitals reviewed.      ASSESSMENT/PLAN     Louisa was seen today for derm problem.    Diagnoses and all orders for this visit:    Contact dermatitis, unspecified contact dermatitis type, unspecified trigger         [] (18818) Gait Re-education- Provided training and instruction to the patient for proper LE, core and proximal hip recruitment and positioning and eccentric body weight control with ambulation re-education including up and down stairs     Home Exercise Program:    [x] (83821) Reviewed/Progressed HEP activities related to strengthening, flexibility, endurance, ROM of core, proximal hip and LE for functional self-care, mobility, lifting and ambulation/stair navigation   [] (87284)Reviewed/Progressed HEP activities related to improving balance, coordination, kinesthetic sense, posture, motor skill, proprioception of core, proximal hip and LE for self care, mobility, lifting, and ambulation/stair navigation      Manual Treatments:  PROM / STM / Oscillations-Mobs:  G-I, II, III, IV (PA's, Inf., Post.)  [x] (42372) Provided manual therapy to mobilize LE, proximal hip and/or LS spine soft tissue/joints for the purpose of modulating pain, promoting relaxation,  increasing ROM, reducing/eliminating soft tissue swelling/inflammation/restriction, improving soft tissue extensibility and allowing for proper ROM for normal function with self care, mobility, lifting and ambulation. Modalities:     [x] GAME READY (VASO)- for significant edema, swelling, pain control. x10'      Charges:  Timed Code Treatment Minutes: 50'   Total Treatment Minutes: 62'     [facility] time in/time out:   (and requires time in and out for each CPT code)    [] EVAL (LOW) 455 1011   [] EVAL (MOD) 13741  [] EVAL (HIGH) 24155   [] RE-EVAL     [x] CD(45953) x 1    [] IONTO  [x] NMR (91843) x 1    [x] VASO  [x] Manual (11288) x 1     [] Other:  [] TA x      [] Mech Traction (39129)  [] ES(attended) (71284)      [] ES (un) (13761):        GOALS:   Patient stated goal: Patient will be able to return to golf without L knee pain. [] Progressing: [] Met: [] Not Met: [] Adjusted    Therapist goals for Patient:   Short Term Goals:  To be achieved in: 2 weeks 1. Independent in HEP and progression per patient tolerance, in order to prevent re-injury. [] Progressing: [] Met: [] Not Met: [] Adjusted  2. Patient will have a decrease in pain to facilitate improvement in movement, function, and ADLs as indicated by Functional Deficits. [] Progressing: [] Met: [] Not Met: [] Adjusted    Long Term Goals: To be achieved in: 8 weeks  1. Disability index score of 33% or less for the LEFS to assist with reaching prior level of function. [] Progressing: [] Met: [] Not Met: [] Adjusted  2. Patient will demonstrate increased AROM to 125 deg of L KF, 0 deg of L KE to allow for proper joint functioning as indicated by patients Functional Deficits. [] Progressing: [] Met: [] Not Met: [] Adjusted  3. Patient will demonstrate an increase in Strength to good proximal hip strength and control, within 5lb HHD in LE to allow for proper functional mobility as indicated by patients Functional Deficits. [] Progressing: [] Met: [] Not Met: [] Adjusted  4. Patient will return to 95% of functional activities without increased symptoms or restriction. [] Progressing: [] Met: [] Not Met: [] Adjusted  5. Patient will be able to walk his dog without L knee pain(patient specific functional goal)    [] Progressing: [] Met: [] Not Met: [] Adjusted       Progression Towards Functional goals:  [] Patient is progressing as expected towards functional goals listed. [] Progression is slowed due to complexities listed. [] Progression has been slowed due to co-morbidities. [x] Plan just implemented, too soon to assess goals progression  [] Other:         Overall Progression Towards Functional goals/ Treatment Progress Update:  [] Patient is progressing as expected towards functional goals listed. [] Progression is slowed due to complexities/Impairments listed. [] Progression has been slowed due to co-morbidities.   [x] Plan just implemented, too soon to assess goals progression <30days [] Goals require adjustment due to lack of progress  [] Patient is not progressing as expected and requires additional follow up with physician  [] Other    Prognosis for POC: [x] Good [] Fair  [] Poor      Patient requires continued skilled intervention: [x] Yes  [] No    Treatment/Activity Tolerance:  [x] Patient able to complete treatment  [] Patient limited by fatigue  [] Patient limited by pain    [] Patient limited by other medical complications  [] Other:     ASSESSMENT: Patient is doing well after L TKA on 1/13/21. Greater focus was on balance and restoring TKE for improved gait mechanics as well as functional strengthening for stair negotiation. Patient is hoping to return to work next week at a physical job where he is required to negotiate stairs, spend time on his feet for prolonged hours ,and occasionally climb ladders- PT made patient aware he needs MD clearance for return to work. He was re-educated on importance of compliance with his HEP in order to see further progress with ROM and strengthening. Patient would benefit from cont skilled PT in order to progress towards full return to ADLs and functional mobility without limitations. Return to Play: (if applicable)   []  Stage 1: Intro to Strength   []  Stage 2: Return to Run and Strength   []  Stage 3: Return to Jump and Strength   []  Stage 4: Dynamic Strength and Agility   []  Stage 5: Sport Specific Training     []  Ready to Return to Play, Meets All Above Stages   []  Not Ready for Return to Sports   Comments:                               PLAN: See eval. Cont L knee ROM and strength. Continue Gait and functional retraining NV.    [x] Continue per plan of care [] Alter current plan (see comments above)  [] Plan of care initiated [] Hold pending MD visit [] Discharge      Electronically signed by:  Randall Desir, PT, DPT, [id number] [address] [id number] Note: If patient does not return for scheduled/ recommended follow up visits, this note will serve as a discharge from care along with most recent update on progress.

## 2021-02-19 ENCOUNTER — HOSPITAL ENCOUNTER (OUTPATIENT)
Dept: PHYSICAL THERAPY | Age: 66
Setting detail: THERAPIES SERIES
Discharge: HOME | End: 2021-02-19
Payer: COMMERCIAL

## 2021-02-19 PROCEDURE — 97140 MANUAL THERAPY 1/> REGIONS: CPT

## 2021-02-19 PROCEDURE — 97110 THERAPEUTIC EXERCISES: CPT

## 2021-02-19 PROCEDURE — 97112 NEUROMUSCULAR REEDUCATION: CPT

## 2021-02-19 PROCEDURE — 97016 VASOPNEUMATIC DEVICE THERAPY: CPT

## 2021-02-19 NOTE — FLOWSHEET NOTE
[facility] [address] [place]  Phone: [phone]  Fax [phone]    Physical Therapy Treatment Note/ Progress Report:           Date:  2021    Patient Name:  Kirsty Mauricio    :  1955  MRN: [id number]  Restrictions/Precautions:    Medical/Treatment Diagnosis Information:  · Diagnosis: M17.12 Primary OA L knee; K52.452 s/p Left TKR DOS 21 at [facility]  · Treatment Diagnosis: M25.562 Left knee pain  Insurance/Certification information:  PT Insurance Information: [facility]  Physician Information:  Referring Practitioner: Renny Walden MD  Has the plan of care been signed (Y/N):        []  Yes  [x]  No     Date of Patient follow up with Physician: 21 VV      Is this a Progress Report:     []  Yes  [x]  No        If Yes:  Date Range for reporting period:  Beginning 21  Ending     Progress report will be due (10 Rx or 30 days whichever is less):        Recertification will be due (POC Duration  / 90 days whichever is less): 21     Visit # Insurance Allowable Auth Required   In-person 8 60 []  Yes [x]  No    Telehealth     []  Yes []  No    Total          Functional Scale: LEFS: 27/80;66%    Date assessed:  21      Therapy Diagnosis/Practice Pattern: H      Number of Comorbidities:  []0     [x]1-2    []3+     Latex Allergy:  [x]NO      []YES  Preferred Language for Healthcare:   [x]English       []other:      Pain level:  1-2/10     SUBJECTIVE:  Patient reports he helped shovel snow at his work office and had no issues in his knee. He notes he was careful when doing so. He has his next surgical f/u appt on Thursday. OBJECTIVE: See eval  ? Observation: continued swelling under PFJ, good patellar mobility    ?  Test measurements:  21- KE: lacking 1-2 deg;  deg AROM     RESTRICTIONS/PRECAUTIONS: L TKR DOS 21    Exercises/Interventions:     Andrés Singer: Allison Lama Therapeutic Ex (48860) Sets/sec Reps Notes/CUES   5\"Hx15      15x2, 3\"H   Improved quad activation; form improves with VC    10\"x10     30\"x3     30\"x3      10\"x10     Slant board calf stretch 30\"x3     2x10      2x10 ea           Trustretch HS stretch 30\"x3     Trustretch KF stretch 30\"x3      2x15      10\"x10     3\"Hx20  3#   L 2x15 ea     2x15   100#   2x15   80#   2x15   60#   EZ stretch KF 5'                 Recumbent bike 6'  ROM KE, KF able to achieve full revolution this date         Pt ed on current condition, POC, expectations for therapy, HEP, stair negotiation, and safe use of cane, icing every hour                   Manual Intervention (34015)      STM L quads, PFJ mobs, tibfem mobs, PROM with OP KE, KF,  10'     tibfib mobs for KF 2'     Seated KF MWM 10x   Improved mobility following                     NMR re-education (07897)   CUES NEEDED   Stair retraining5'  VC for technique    FSU 6\" R/L2x15 ea     FSD 6\" L LE 2x15  VC for form and eccentric control     10'x5  30#    2x15     STS on airex  2x10  Cued for eccentric control/balance                      Therapeutic Activity (44357)                                                Therapeutic Exercise and NMR EXR  [x] (50965) Provided verbal/tactile cueing for activities related to strengthening, flexibility, endurance, ROM for improvements in LE, proximal hip, and core control with self care, mobility, lifting, ambulation. [x] (95893) Provided verbal/tactile cueing for activities related to improving balance, coordination, kinesthetic sense, posture, motor skill, proprioception  to assist with LE, proximal hip, and core control in self care, mobility, lifting, ambulation and eccentric single leg control.      NMR and Therapeutic Activities: [] (94878 or 86772) Provided verbal/tactile cueing for activities related to improving balance, coordination, kinesthetic sense, posture, motor skill, proprioception and motor activation to allow for proper function of core, proximal hip and LE with self care and ADLs  [] (28523) Gait Re-education- Provided training and instruction to the patient for proper LE, core and proximal hip recruitment and positioning and eccentric body weight control with ambulation re-education including up and down stairs     Home Exercise Program:    [x] (32667) Reviewed/Progressed HEP activities related to strengthening, flexibility, endurance, ROM of core, proximal hip and LE for functional self-care, mobility, lifting and ambulation/stair navigation   [] (00071)Reviewed/Progressed HEP activities related to improving balance, coordination, kinesthetic sense, posture, motor skill, proprioception of core, proximal hip and LE for self care, mobility, lifting, and ambulation/stair navigation      Manual Treatments:  PROM / STM / Oscillations-Mobs:  G-I, II, III, IV (PA's, Inf., Post.)  [x] (72558) Provided manual therapy to mobilize LE, proximal hip and/or LS spine soft tissue/joints for the purpose of modulating pain, promoting relaxation,  increasing ROM, reducing/eliminating soft tissue swelling/inflammation/restriction, improving soft tissue extensibility and allowing for proper ROM for normal function with self care, mobility, lifting and ambulation. Modalities:     [x] GAME READY (VASO)- for significant edema, swelling, pain control. x10'      Charges:  Timed Code Treatment Minutes: 37'   Total Treatment Minutes: 48'     [facility] time in/time out:   (and requires time in and out for each CPT code)    [] EVAL (LOW) 455 1011   [] EVAL (MOD) 76161  [] EVAL (HIGH) 00377   [] RE-EVAL     [x] DM(57457) x 1    [] IONTO  [x] NMR (18511) x 1    [x] VASO  [x] Manual (76776) x 1     [] Other:  [] TA x      [] [facility] (86087) [] Patient is progressing as expected towards functional goals listed. [] Progression is slowed due to complexities/Impairments listed. [] Progression has been slowed due to co-morbidities. [x] Plan just implemented, too soon to assess goals progression <30days   [] Goals require adjustment due to lack of progress  [] Patient is not progressing as expected and requires additional follow up with physician  [] Other    Prognosis for POC: [x] Good [] Fair  [] Poor      Patient requires continued skilled intervention: [x] Yes  [] No    Treatment/Activity Tolerance:  [x] Patient able to complete treatment  [] Patient limited by fatigue  [] Patient limited by pain    [] Patient limited by other medical complications  [] Other:     ASSESSMENT: Patient is doing well after L TKA on 1/13/21. He remains most limited into KF so additional stretches were added this session. By End of session her achieved 125 deg of KF with PT OP. Patient is hoping to return to work next week at a physical job where he is required to negotiate stairs, spend time on his feet for prolonged hours ,and occasionally climb ladders- PT made patient aware he needs MD clearance for return to work. He was re-educated on importance of compliance with his HEP in order to see further progress with ROM and strengthening. Patient would benefit from cont skilled PT in order to progress towards full return to ADLs and functional mobility without limitations. Return to Play: (if applicable)   []  Stage 1: Intro to Strength   []  Stage 2: Return to Run and Strength   []  Stage 3: Return to Jump and Strength   []  Stage 4: Dynamic Strength and Agility   []  Stage 5: Sport Specific Training     []  Ready to Return to Play, Meets All Above Stages   []  Not Ready for Return to Sports   Comments:                               PLAN: See eval. Cont L knee ROM and strength. Continue Gait and functional retraining NV. Op Note NV. [x] Continue per plan of care [] Alter current plan (see comments above)  [] Plan of care initiated [] Hold pending MD visit [] Discharge      Electronically signed by:  Ramandeep Templeton, PT, DPT, [facility] [id number]     Note: If patient does not return for scheduled/ recommended follow up visits, this note will serve as a discharge from care along with most recent update on progress.

## 2021-02-23 ENCOUNTER — HOSPITAL ENCOUNTER (OUTPATIENT)
Dept: PHYSICAL THERAPY | Age: 66
Setting detail: THERAPIES SERIES
Discharge: HOME | End: 2021-02-23
Payer: COMMERCIAL

## 2021-02-23 PROCEDURE — 97140 MANUAL THERAPY 1/> REGIONS: CPT

## 2021-02-23 PROCEDURE — 97110 THERAPEUTIC EXERCISES: CPT

## 2021-02-23 NOTE — FLOWSHEET NOTE
[facility],  [address] [place]  Phone: [phone]  Fax [phone]    Physical Therapy Treatment Note/ Progress Report:           Date:  2021    Patient Name:  Mary Brizuela    :  1955  MRN: [id number]  Restrictions/Precautions:    Medical/Treatment Diagnosis Information:  · Diagnosis: M17.12 Primary OA L knee; H77.740 s/p Left TKR DOS 21 at [facility]  · Treatment Diagnosis: M25.562 Left knee pain  Insurance/Certification information:  PT Insurance Information: [facility]   Physician Information:  Referring Practitioner: Amanda Douglas MD  Has the plan of care been signed (Y/N):        []  Yes  [x]  No     Date of Patient follow up with Physician: 21 VV      Is this a Progress Report:     []  Yes  [x]  No        If Yes:  Date Range for reporting period:  Beginning 21  Ending     Progress report will be due (10 Rx or 30 days whichever is less): 82       Recertification will be due (POC Duration  / 90 days whichever is less): 21     Visit # Insurance Allowable Auth Required   In-person 9 60 []  Yes [x]  No    Telehealth     []  Yes []  No    Total          Functional Scale: LEFS: 27/80;66%    Date assessed:  21      Therapy Diagnosis/Practice Pattern: H      Number of Comorbidities:  []0     [x]1-2    []3+     Latex Allergy:  [x]NO      []YES  Preferred Language for Healthcare:   [x]English       []other:      Pain level:  1-2/10     SUBJECTIVE:  Patient reports no issues since his last session. His knee is feeling good today. OBJECTIVE: See eval  ? Observation: continued swelling under PFJ, good patellar mobility    ?  Test measurements:  21- KE: lacking 0 deg;  deg PROM following MT and stretching    RESTRICTIONS/PRECAUTIONS: L TKR DOS 21    Exercises/Interventions:     [employer]: [id number]    Therapeutic Ex (61295) Sets/sec Reps Notes/CUES   5\"Hx15 [] (10101 or 23290) Provided verbal/tactile cueing for activities related to improving balance, coordination, kinesthetic sense, posture, motor skill, proprioception and motor activation to allow for proper function of core, proximal hip and LE with self care and ADLs  [] (30279) Gait Re-education- Provided training and instruction to the patient for proper LE, core and proximal hip recruitment and positioning and eccentric body weight control with ambulation re-education including up and down stairs     Home Exercise Program:    [x] (52842) Reviewed/Progressed HEP activities related to strengthening, flexibility, endurance, ROM of core, proximal hip and LE for functional self-care, mobility, lifting and ambulation/stair navigation   [] (50207)Reviewed/Progressed HEP activities related to improving balance, coordination, kinesthetic sense, posture, motor skill, proprioception of core, proximal hip and LE for self care, mobility, lifting, and ambulation/stair navigation      Manual Treatments:  PROM / STM / Oscillations-Mobs:  G-I, II, III, IV (PA's, Inf., Post.)  [x] (61180) Provided manual therapy to mobilize LE, proximal hip and/or LS spine soft tissue/joints for the purpose of modulating pain, promoting relaxation,  increasing ROM, reducing/eliminating soft tissue swelling/inflammation/restriction, improving soft tissue extensibility and allowing for proper ROM for normal function with self care, mobility, lifting and ambulation. Modalities:  CPx8'   [] GAME READY (VASO)- for significant edema, swelling, pain control. x10'      Charges:  Timed Code Treatment Minutes: 39'   Total Treatment Minutes: 48'     [facility] time in/time out:   (and requires time in and out for each CPT code)    [] EVAL (LOW) 455 1011   [] EVAL (MOD) 79292  [] EVAL (HIGH) 01668   [] RE-EVAL     [x] FJ(67480) x 2    [] IONTO  [] NMR (60362) x     [] VASO  [x] Manual (07789) x 1     [] Other:  [] TA x      [] [facility] (39295) [] Patient is progressing as expected towards functional goals listed. [] Progression is slowed due to complexities/Impairments listed. [] Progression has been slowed due to co-morbidities. [x] Plan just implemented, too soon to assess goals progression <30days   [] Goals require adjustment due to lack of progress  [] Patient is not progressing as expected and requires additional follow up with physician  [] Other    Prognosis for POC: [x] Good [] Fair  [] Poor      Patient requires continued skilled intervention: [x] Yes  [] No    Treatment/Activity Tolerance:  [x] Patient able to complete treatment  [] Patient limited by fatigue  [] Patient limited by pain    [] Patient limited by other medical complications  [] Other:     ASSESSMENT: Patient is doing well after L TKA on 1/13/21. Patient with continued limitations primarily into KF though he achieved full KE this date. By End of session he achieved 123 deg of KF with PT OP. He was re-educated on importance of performing stretches and HEP. Patient is hoping to return to work next week at a physical job where he is required to negotiate stairs, spend time on his feet for prolonged hours ,and occasionally climb ladders- PT made patient aware he needs MD clearance for return to work. He was re-educated on importance of compliance with his HEP in order to see further progress with ROM and strengthening. Patient would benefit from cont skilled PT in order to progress towards full return to ADLs and functional mobility without limitations.           Return to Play: (if applicable)   []  Stage 1: Intro to Strength   []  Stage 2: Return to Run and Strength   []  Stage 3: Return to Jump and Strength   []  Stage 4: Dynamic Strength and Agility   []  Stage 5: Sport Specific Training     []  Ready to Return to Play, Meets All Above Stages   []  Not Ready for Return to Sports   Comments: PLAN: See eval. Cont L knee ROM and strength. Continue Gait and functional retraining NV. [x] Continue per plan of care [] Alter current plan (see comments above)  [] Plan of care initiated [] Hold pending MD visit [] Discharge      Electronically signed by:  Declan Harrington PT, DPT, [facility]AT [id number]     Note: If patient does not return for scheduled/ recommended follow up visits, this note will serve as a discharge from care along with most recent update on progress.

## 2021-02-26 ENCOUNTER — HOSPITAL ENCOUNTER (OUTPATIENT)
Dept: PHYSICAL THERAPY | Age: 66
Setting detail: THERAPIES SERIES
Discharge: HOME | End: 2021-02-26
Payer: COMMERCIAL

## 2021-02-26 PROCEDURE — 97140 MANUAL THERAPY 1/> REGIONS: CPT

## 2021-02-26 PROCEDURE — 97112 NEUROMUSCULAR REEDUCATION: CPT

## 2021-02-26 PROCEDURE — 97016 VASOPNEUMATIC DEVICE THERAPY: CPT

## 2021-02-26 PROCEDURE — 97110 THERAPEUTIC EXERCISES: CPT

## 2021-02-26 NOTE — PROGRESS NOTES
[facility],  [address] [place]  Phone: [phone]  Fax [phone]    Physical Therapy Treatment Note/ Progress Report:           Date:  2021    Patient Name:  Stephani Siddiqui    :  1955  MRN: [id number]  Restrictions/Precautions:    Medical/Treatment Diagnosis Information:  · Diagnosis: M17.12 Primary OA L knee; Z32.586 s/p Left TKR DOS 21 at [facility]  · Treatment Diagnosis: M25.562 Left knee pain  Insurance/Certification information:  PT Insurance Information: [employer]  Physician Information:  Referring Practitioner: Berkley Esqueda MD  Has the plan of care been signed (Y/N):        []  Yes  [x]  No     Date of Patient follow up with Physician: last visit on 21      Is this a Progress Report:     []  Yes  [x]  No        If Yes:  Date Range for reporting period:  Beginning 21  Ending 21    Progress report will be due (10 Rx or 30 days whichever is less):        Recertification will be due (POC Duration  / 90 days whichever is less): 21     Visit # Insurance Allowable Auth Required   In-person 10 60 []  Yes [x]  No    Telehealth     []  Yes []  No    Total          Functional Scale: LEFS: 27/80;66%    Date assessed:  21      Therapy Diagnosis/Practice Pattern: H      Number of Comorbidities:  []0     [x]1-2    []3+     Latex Allergy:  [x]NO      []YES  Preferred Language for Healthcare:   [x]English       []other:      Pain level:  0/10     SUBJECTIVE:  Patient notes he had his follow up with his surgeon yesterday who is reporting that the prosthetic looks good on x-ray. His MD emphasized need to continue to perform his stretches consistently. He does note he is using the bike at the gym. OBJECTIVE: See eval  ? Observation: continued swelling under PFJ, good patellar mobility    ?  Test measurements: See below      PT Practice Pattern: H  Co morbidities: 1-2 surgical   INITIAL VISIT  CURRENT VISIT  2/26/21   PAIN  2/10 0/10    FUNCTIONAL SCALE LEFS 27/80; 66% disability  67/80; 16% disability    ROM L KE Lacking 10 deg 0 deg PROM     L KF 85 deg 120 deg with PT OP                                    STRENGHT (MMT) Quad Fair; quad lag noted on SLR Good                                                          RESTRICTIONS/PRECAUTIONS: L TKR DOS 1/13/21        Therapeutic Exercise and NMR EXR  [x] (26563) Provided verbal/tactile cueing for activities related to strengthening, flexibility, endurance, ROM for improvements in LE, proximal hip, and core control with self care, mobility, lifting, ambulation. [x] (19287) Provided verbal/tactile cueing for activities related to improving balance, coordination, kinesthetic sense, posture, motor skill, proprioception  to assist with LE, proximal hip, and core control in self care, mobility, lifting, ambulation and eccentric single leg control.      NMR and Therapeutic Activities:    [] (03080 or 24926) Provided verbal/tactile cueing for activities related to improving balance, coordination, kinesthetic sense, posture, motor skill, proprioception and motor activation to allow for proper function of core, proximal hip and LE with self care and ADLs  [] (04270) Gait Re-education- Provided training and instruction to the patient for proper LE, core and proximal hip recruitment and positioning and eccentric body weight control with ambulation re-education including up and down stairs     Home Exercise Program:    [x] (53260) Reviewed/Progressed HEP activities related to strengthening, flexibility, endurance, ROM of core, proximal hip and LE for functional self-care, mobility, lifting and ambulation/stair navigation [] (59206)Reviewed/Progressed HEP activities related to improving balance, coordination, kinesthetic sense, posture, motor skill, proprioception of core, proximal hip and LE for self care, mobility, lifting, and ambulation/stair navigation      Manual Treatments:  PROM / STM / Oscillations-Mobs:  G-I, II, III, IV (PA's, Inf., Post.)  [x] (57598) Provided manual therapy to mobilize LE, proximal hip and/or LS spine soft tissue/joints for the purpose of modulating pain, promoting relaxation,  increasing ROM, reducing/eliminating soft tissue swelling/inflammation/restriction, improving soft tissue extensibility and allowing for proper ROM for normal function with self care, mobility, lifting and ambulation. Modalities:     [x] GAME READY (VASO)- for significant edema, swelling, pain control. x10'      Charges:  Timed Code Treatment Minutes: 54'   Total Treatment Minutes: 72'     [facility] time in/time out:   (and requires time in and out for each CPT code)    [] EVAL (LOW) 455 1011   [] EVAL (MOD) 35444  [] EVAL (HIGH) 96798   [] RE-EVAL     [x] SL(50643) x 2    [] IONTO  [x] NMR (91109) x 1    [x] VASO  [x] Manual (22017) x 1     [] Other:  [] TA x      [] Mech Traction (47335)  [] ES(attended) (12124)      [] ES (un) (16620):        GOALS:   Patient stated goal: Patient will be able to return to golf without L knee pain. - Not yet attempted   [x] Progressing: [] Met: [] Not Met: [] Adjusted    Therapist goals for Patient:   Short Term Goals: To be achieved in: 2 weeks  1. Independent in HEP and progression per patient tolerance, in order to prevent re-injury. [] Progressing: [x] Met: [] Not Met: [] Adjusted  2. Patient will have a decrease in pain to facilitate improvement in movement, function, and ADLs as indicated by Functional Deficits. [] Progressing: [x] Met: [] Not Met: [] Adjusted    Long Term Goals:  To be achieved in: 8 weeks [] Patient limited by pain    [] Patient limited by other medical complications  [] Other:     ASSESSMENT: Patient is doing well after L TKA on 1/13/21. Overall he has been able to return to walking without use of an AD and is now able to take his dog on short walks. He has limited mobility into full KF but has achieved full KE. His strength is improving but he does have limited endurance for performing ADLs and work-related activities. 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 Normally he has a physical job where he is required to negotiate stairs, spend time on his feet for prolonged hours ,and occasionally climb ladders- PT made patient aware he needs MD clearance for return to work. He was re-educated on importance of compliance with his HEP in order to see further progress with ROM and strengthening. Patient would benefit from cont skilled PT in order to progress towards full return to ADLs and functional mobility without limitations. Return to Play: (if applicable)   []  Stage 1: Intro to Strength   []  Stage 2: Return to Run and Strength   []  Stage 3: Return to Jump and Strength   []  Stage 4: Dynamic Strength and Agility   []  Stage 5: Sport Specific Training     []  Ready to Return to Play, Meets All Above Stages   []  Not Ready for Return to Sports   Comments:                               PLAN: See eval. Cont L knee ROM and strength. Continue Gait and functional retraining NV. Recommend continued PT 2x/week for 3 more weeks. [x] Continue per plan of care [] Alter current plan (see comments above)  [] Plan of care initiated [] Hold pending MD visit [] Discharge      Electronically signed by:  Jon Thomason PT, DPT, [facility] [id number]     Note: If patient does not return for scheduled/ recommended follow up visits, this note will serve as a discharge from care along with most recent update on progress.

## 2021-02-26 NOTE — FLOWSHEET NOTE
[facility],  [address]  Phone: [phone]  Fax [phone]    Physical Therapy Treatment Note/ Progress Report:           Date:  2021    Patient Name:  Sonya Shelton    :  1955  MRN: [id number]  Restrictions/Precautions:    Medical/Treatment Diagnosis Information:  · Diagnosis: M17.12 Primary OA L knee; M94.903 s/p Left TKR DOS 21 at [facility]  · Treatment Diagnosis: M25.562 Left knee pain  Insurance/Certification information:  PT Insurance Information: [employer]  Physician Information:  Referring Practitioner: Franklyn Markham MD  Has the plan of care been signed (Y/N):        []  Yes  [x]  No     Date of Patient follow up with Physician: last visit on 21      Is this a Progress Report:     []  Yes  [x]  No        If Yes:  Date Range for reporting period:  Beginning 21  Ending 21    Progress report will be due (10 Rx or 30 days whichever is less): 3/41/15       Recertification will be due (POC Duration  / 90 days whichever is less): 21     Visit # Insurance Allowable Auth Required   In-person 10 60 []  Yes [x]  No    Telehealth     []  Yes []  No    Total          Functional Scale: LEFS: 27/80;66%    Date assessed:  21      Therapy Diagnosis/Practice Pattern: H      Number of Comorbidities:  []0     [x]1-2    []3+     Latex Allergy:  [x]NO      []YES  Preferred Language for Healthcare:   [x]English       []other:      Pain level:  0/10     SUBJECTIVE:  Patient notes he had his follow up with his surgeon yesterday who is reporting that the prosthetic looks good on x-ray. His MD emphasized need to continue to perform his stretches consistently. He does note he is using the bike at the gym. OBJECTIVE: See eval  ? Observation: continued swelling under PFJ, good patellar mobility    ?  Test measurements: See below      PT Practice Pattern: H  Co morbidities: 1-2 surgical   INITIAL VISIT  CURRENT VISIT  2/26/21   PAIN  2/10 0/10    FUNCTIONAL SCALE LEFS 27/80; 66% disability  67/80; 16% disability    ROM L KE Lacking 10 deg 0 deg PROM     L KF 85 deg 120 deg with PT OP                                    STRENGHT (MMT) Quad Fair; quad lag noted on SLR Good                                                          RESTRICTIONS/PRECAUTIONS: L TKR DOS 1/13/21    Exercises/Interventions:     Faraday: [id number]    Therapeutic Ex (44697) Sets/sec Reps Notes/CUES   5\"Hx15      15x2, 3\"H   Improved quad activation; form improves with VC    10\"x10     30\"x3     30\"x3      10\"x10     Slant board calf stretch 30\"x3     2x10      2x10 ea     KF stretch on step 30\"x3     Trustretch HS stretch 30\"x3     Trustretch KF stretch 30\"x3      2x15      10\"x10     3\"Hx20  3#   L 2x15 ea     LP: DL 2x15   110#   2 up/1 down  2x15   90#         Leg extension 2x10, 3\"H  35#   Leg curls  2x10, 3\"H  35#    2x15   60#    5'     15'x4     30\"x5           Recumbent bike 6'  ROM KE, KF able to achieve full revolution this date         Pt ed on current condition, POC, expectations for therapy, HEP, stair negotiation, and safe use of cane, icing every hour                   Manual Intervention (75406)      IASTM L quads, HS ,gastroc , PFJ mobs, tibfem mobs, PROM with OP KE, KF,  15'     tibfib mobs for KF 2'     Seated KF MWM 10x   Improved mobility following   Contract relax stretch for KF x5     Prone quad stretch 3'           NMR re-education (55028)   CUES NEEDED   Stair retraining  3'  VC for technique    FSU 6\" R/L 2x15 ea     FSD 6\" L LE 2x15  improved eccentric control     10'x5  30#    2x15      2x10  Cued for eccentric control/balance                      Therapeutic Activity (54364)                                                Therapeutic Exercise and NMR EXR [x] (41281) Provided manual therapy to mobilize LE, proximal hip and/or LS spine soft tissue/joints for the purpose of modulating pain, promoting relaxation,  increasing ROM, reducing/eliminating soft tissue swelling/inflammation/restriction, improving soft tissue extensibility and allowing for proper ROM for normal function with self care, mobility, lifting and ambulation. Modalities:     [x] GAME READY (VASO)- for significant edema, swelling, pain control. x10'      Charges:  Timed Code Treatment Minutes: 54'   Total Treatment Minutes: 72'     [facility] time in/time out:   (and requires time in and out for each CPT code)    [] EVAL (LOW) 455 1011   [] EVAL (MOD) 93277  [] EVAL (HIGH) 77911   [] RE-EVAL     [x] IQ(59832) x 2    [] IONTO  [x] NMR (08876) x 1    [x] VASO  [x] Manual (01441) x 1     [] Other:  [] TA x      [] Mech Traction (11578)  [] ES(attended) (48275)      [] ES (un) (40230):        GOALS:   Patient stated goal: Patient will be able to return to golf without L knee pain. - Not yet attempted   [x] Progressing: [] Met: [] Not Met: [] Adjusted    Therapist goals for Patient:   Short Term Goals: To be achieved in: 2 weeks  1. Independent in HEP and progression per patient tolerance, in order to prevent re-injury. [] Progressing: [x] Met: [] Not Met: [] Adjusted  2. Patient will have a decrease in pain to facilitate improvement in movement, function, and ADLs as indicated by Functional Deficits. [] Progressing: [x] Met: [] Not Met: [] Adjusted    Long Term Goals: To be achieved in: 8 weeks  1. Disability index score of 33% or less for the LEFS to assist with reaching prior level of function. [] Progressing: [x] Met: [] Not Met: [] Adjusted  2. Patient will demonstrate increased AROM to 125 deg of L KF, 0 deg of L KE to allow for proper joint functioning as indicated by patients Functional Deficits.    [x] Progressing: [] Met: [] Not Met: [] Adjusted ASSESSMENT: Patient is doing well after L TKA on 1/13/21. Overall he has been able to return to walking without use of an AD and is now able to take his dog on short walks. He has limited mobility into full KF but has achieved full KE. His strength is improving but he does have limited endurance for performing ADLs and work-related activities. 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 Normally he has a physical job where he is required to negotiate stairs, spend time on his feet for prolonged hours ,and occasionally climb ladders- PT made patient aware he needs MD clearance for return to work. He was re-educated on importance of compliance with his HEP in order to see further progress with ROM and strengthening. Patient would benefit from cont skilled PT in order to progress towards full return to ADLs and functional mobility without limitations. Return to Play: (if applicable)   []  Stage 1: Intro to Strength   []  Stage 2: Return to Run and Strength   []  Stage 3: Return to Jump and Strength   []  Stage 4: Dynamic Strength and Agility   []  Stage 5: Sport Specific Training     []  Ready to Return to Play, Meets All Above Stages   []  Not Ready for Return to Sports   Comments:                               PLAN: See eval. Cont L knee ROM and strength. Continue Gait and functional retraining NV. Recommend continued PT 2x/week for 3 more weeks. [x] Continue per plan of care [] Alter current plan (see comments above)  [] Plan of care initiated [] Hold pending MD visit [] Discharge      Electronically signed by:  Ian Wells, PT, DPT, OCS [id number]     Note: If patient does not return for scheduled/ recommended follow up visits, this note will serve as a discharge from care along with most recent update on progress.

## 2021-03-02 ENCOUNTER — HOSPITAL ENCOUNTER (OUTPATIENT)
Dept: PHYSICAL THERAPY | Age: 66
Setting detail: THERAPIES SERIES
Discharge: HOME | End: 2021-03-02
Payer: COMMERCIAL

## 2021-03-02 PROCEDURE — 97112 NEUROMUSCULAR REEDUCATION: CPT

## 2021-03-02 PROCEDURE — 97140 MANUAL THERAPY 1/> REGIONS: CPT

## 2021-03-02 PROCEDURE — 97110 THERAPEUTIC EXERCISES: CPT

## 2021-03-02 NOTE — FLOWSHEET NOTE
[facility],  [address] [place]  Phone: [phone]  Fax [phone]    Physical Therapy Treatment Note/ Progress Report:           Date:  3/2/2021    Patient Name:  Bar Ruvalcaba    :  1955  MRN: [id number]  Restrictions/Precautions:    Medical/Treatment Diagnosis Information:  · Diagnosis: M17.12 Primary OA L knee; X73.947 s/p Left TKR DOS 21 at [facility], operated by [facility]  · Treatment Diagnosis: M25.562 Left knee pain  Insurance/Certification information:  PT Insurance Information: [employer]  Physician Information:  Referring Practitioner: Rip Jamison MD  Has the plan of care been signed (Y/N):        []  Yes  [x]  No     Date of Patient follow up with Physician: last visit on 21      Is this a Progress Report:     []  Yes  [x]  No        If Yes:  Date Range for reporting period:  Beginning 21  Ending 21    Progress report will be due (10 Rx or 30 days whichever is less):        Recertification will be due (POC Duration  / 90 days whichever is less): 21     Visit # Insurance Allowable Auth Required   In-person 11 60 []  Yes [x]  No    Telehealth     []  Yes []  No    Total          Functional Scale: LEFS: 27/80;66%    Date assessed:  21      Therapy Diagnosis/Practice Pattern: H      Number of Comorbidities:  []0     [x]1-2    []3+     Latex Allergy:  [x]NO      []YES  Preferred Language for Healthcare:   [x]English       []other:      Pain level:  0/10     SUBJECTIVE:  Patient reports his knee is feeling good overall. OBJECTIVE: See eval  ? Observation: continued swelling under PFJ, good patellar mobility    ?  Test measurements: See below      PT Practice Pattern: H  Co morbidities: 1-2  surgical   INITIAL VISIT  CURRENT VISIT  21   PAIN  2/10 0/10    FUNCTIONAL SCALE LEFS 27/80; 66% disability  67/80; 16% disability    ROM L KE Lacking 10 deg 0 deg PROM L KF 85 deg 120 deg with PT OP                                    STRENGHT (MMT) Quad Fair; quad lag noted on SLR Good                                                          RESTRICTIONS/PRECAUTIONS: L TKR DOS 1/13/21    Exercises/Interventions:     [employer]: [id number]    Therapeutic Ex (84197) Sets/sec Reps Notes/CUES   5\"Hx15      15x2, 3\"H   Improved quad activation; form improves with VC    10\"x10     30\"x3     30\"x3      10\"x10     Slant board calf stretch 30\"x3     2x10      2x10 ea      30\"x3     Trustretch HS stretch 30\"x3     Trustretch KF stretch 30\"x3      2x15      10\"x10     3\"Hx20  3#   L 2x15 ea     2x15   110#   2x15   90#        2x10, 3\"H  35#   2x10, 3\"H  35#    2x15   60#    5'     LBW OVL20'x3     Monster walks OVL20'x3     30\"x5           Recumbent bike 6'  ROM KE, KF able to achieve full revolution this date         Pt ed on current condition, POC, expectations for therapy, HEP, stair negotiation, and safe use of cane, icing every hour                   Manual Intervention (24006)      IASTM L quads, HS ,gastroc , PFJ mobs, tibfem mobs, PROM with OP KE, KF,  15'            Improved mobility following        Prone quad stretch 3'           NMR re-education (70925)   CUES NEEDED   Stair retraining    VC for technique    FSU 6\" R/L 2x15 ea     FSD 6\" L LE 2x15  improved eccentric control     10'x5  30#    2x15      2x10  Cued for eccentric control/balance    SB squats 5\"H 2x15   VC for symmetrical WB               Therapeutic Activity (48610)                                                Therapeutic Exercise and NMR EXR  [x] (85003) Provided verbal/tactile cueing for activities related to strengthening, flexibility, endurance, ROM for improvements in LE, proximal hip, and core control with self care, mobility, lifting, ambulation. [x] (61264) Provided verbal/tactile cueing for activities related to improving balance, coordination, kinesthetic sense, posture, motor skill, proprioception  to assist with LE, proximal hip, and core control in self care, mobility, lifting, ambulation and eccentric single leg control. NMR and Therapeutic Activities:    [] (05772 or 40864) Provided verbal/tactile cueing for activities related to improving balance, coordination, kinesthetic sense, posture, motor skill, proprioception and motor activation to allow for proper function of core, proximal hip and LE with self care and ADLs  [] (90329) Gait Re-education- Provided training and instruction to the patient for proper LE, core and proximal hip recruitment and positioning and eccentric body weight control with ambulation re-education including up and down stairs     Home Exercise Program:    [x] (20068) Reviewed/Progressed HEP activities related to strengthening, flexibility, endurance, ROM of core, proximal hip and LE for functional self-care, mobility, lifting and ambulation/stair navigation   [] (22578)Reviewed/Progressed HEP activities related to improving balance, coordination, kinesthetic sense, posture, motor skill, proprioception of core, proximal hip and LE for self care, mobility, lifting, and ambulation/stair navigation      Manual Treatments:  PROM / STM / Oscillations-Mobs:  G-I, II, III, IV (PA's, Inf., Post.)  [x] (89784) Provided manual therapy to mobilize LE, proximal hip and/or LS spine soft tissue/joints for the purpose of modulating pain, promoting relaxation,  increasing ROM, reducing/eliminating soft tissue swelling/inflammation/restriction, improving soft tissue extensibility and allowing for proper ROM for normal function with self care, mobility, lifting and ambulation. Modalities:   Declined    [] GAME READY (VASO)- for significant edema, swelling, pain control. x10'      Charges:  Timed Code Treatment Minutes: 36' Total Treatment Minutes: 36'     [facility] time in/time out:   (and requires time in and out for each CPT code)    [] EVAL (LOW) 455 1011   [] EVAL (MOD) 01341  [] EVAL (HIGH) 55247   [] RE-EVAL     [x] SILVESRTE(48482) x 1    [] IONTO  [x] NMR (08472) x 1    [] VASO  [x] Manual (70761) x 1     [] Other:  [] TA x      [] Mech Traction (80866)  [] ES(attended) (73302)      [] ES (un) (90254):        GOALS:   Patient stated goal: Patient will be able to return to golf without L knee pain. - Not yet attempted   [x] Progressing: [] Met: [] Not Met: [] Adjusted    Therapist goals for Patient:   Short Term Goals: To be achieved in: 2 weeks  1. Independent in HEP and progression per patient tolerance, in order to prevent re-injury. [] Progressing: [x] Met: [] Not Met: [] Adjusted  2. Patient will have a decrease in pain to facilitate improvement in movement, function, and ADLs as indicated by Functional Deficits. [] Progressing: [x] Met: [] Not Met: [] Adjusted    Long Term Goals: To be achieved in: 8 weeks  1. Disability index score of 33% or less for the LEFS to assist with reaching prior level of function. [] Progressing: [x] Met: [] Not Met: [] Adjusted  2. Patient will demonstrate increased AROM to 125 deg of L KF, 0 deg of L KE to allow for proper joint functioning as indicated by patients Functional Deficits. [x] Progressing: [] Met: [] Not Met: [] Adjusted  3. Patient will demonstrate an increase in Strength to good proximal hip strength and control, within 5lb HHD in LE to allow for proper functional mobility as indicated by patients Functional Deficits. [x] Progressing: [] Met: [] Not Met: [] Adjusted  4. Patient will return to 95% of functional activities without increased symptoms or restriction. [x] Progressing: [] Met: [] Not Met: [] Adjusted  5.  Patient will be able to walk his dog without L knee pain(patient specific functional goal)    [] Progressing: [x] Met: [] Not Met: [] Adjusted Progression Towards Functional goals:  [x] Patient is progressing as expected towards functional goals listed. [] Progression is slowed due to complexities listed. [] Progression has been slowed due to co-morbidities. [] Plan just implemented, too soon to assess goals progression  [] Other:         Overall Progression Towards Functional goals/ Treatment Progress Update:  [x] Patient is progressing as expected towards functional goals listed. [] Progression is slowed due to complexities/Impairments listed. [] Progression has been slowed due to co-morbidities. [] Plan just implemented, too soon to assess goals progression <30days   [] Goals require adjustment due to lack of progress  [] Patient is not progressing as expected and requires additional follow up with physician  [] Other    Prognosis for POC: [x] Good [] Fair  [] Poor      Patient requires continued skilled intervention: [x] Yes  [] No    Treatment/Activity Tolerance:  [x] Patient able to complete treatment  [] Patient limited by fatigue  [] Patient limited by pain    [] Patient limited by other medical complications  [] Other:     ASSESSMENT: Patient is doing well after L TKA on 1/13/21. Focus this session was on improving KF ROM and functional strengthening. He demonstrates improving ROM this session and tolerated new challenges well this visit. Normally he has a physical job where he is required to negotiate stairs, spend time on his feet for prolonged hours ,and occasionally climb ladders- PT made patient aware he needs MD clearance for return to work. He was re-educated on importance of compliance with his HEP in order to see further progress with ROM and strengthening. Patient would benefit from cont skilled PT in order to progress towards full return to ADLs and functional mobility without limitations.           Return to Play: (if applicable)   []  Stage 1: Intro to Strength   []  Stage 2: Return to Run and Strength []  Stage 3: Return to Jump and Strength   []  Stage 4: Dynamic Strength and Agility   []  Stage 5: Sport Specific Training     []  Ready to Return to Play, Meets All Above Stages   []  Not Ready for Return to Sports   Comments:                               PLAN: See eval. Cont L knee ROM and strength. Continue Gait and functional retraining NV. Recommend continued PT 2x/week for 3 more weeks. [x] Continue per plan of care [] Alter current plan (see comments above)  [] Plan of care initiated [] Hold pending MD visit [] Discharge      Electronically signed by:  Baljeet Jefferson, PT, DPT, [facility] [id number]     Note: If patient does not return for scheduled/ recommended follow up visits, this note will serve as a discharge from care along with most recent update on progress.

## 2021-03-05 ENCOUNTER — HOSPITAL ENCOUNTER (OUTPATIENT)
Dept: PHYSICAL THERAPY | Age: 66
Setting detail: THERAPIES SERIES
Discharge: HOME | End: 2021-03-05
Payer: COMMERCIAL

## 2021-03-05 PROCEDURE — 97110 THERAPEUTIC EXERCISES: CPT

## 2021-03-05 PROCEDURE — 97140 MANUAL THERAPY 1/> REGIONS: CPT

## 2021-03-05 PROCEDURE — 97112 NEUROMUSCULAR REEDUCATION: CPT

## 2021-03-05 NOTE — FLOWSHEET NOTE
[facility],  [address] [place]  Phone: [phone]  Fax [phone]    Physical Therapy Treatment Note/ Progress Report:           Date:  3/5/2021    Patient Name:  Kirsty Mauricio    :  1955  MRN: [id number]  Restrictions/Precautions:    Medical/Treatment Diagnosis Information:  · Diagnosis: M17.12 Primary OA L knee; W13.493 s/p Left TKR DOS 21 at [facility]  · Treatment Diagnosis: M25.562 Left knee pain  Insurance/Certification information:  PT Insurance Information: [employer]  Physician Information:  Referring Practitioner: Renny Walden MD  Has the plan of care been signed (Y/N):        []  Yes  [x]  No     Date of Patient follow up with Physician: last visit on 21      Is this a Progress Report:     []  Yes  [x]  No        If Yes:  Date Range for reporting period:  Beginning 21  Ending 21    Progress report will be due (10 Rx or 30 days whichever is less): 3/26/73       Recertification will be due (POC Duration  / 90 days whichever is less): 21     Visit # Insurance Allowable Auth Required   In-person 12 60 []  Yes [x]  No    Telehealth     []  Yes []  No    Total          Functional Scale: LEFS: 27/80;66%    Date assessed:  21      Therapy Diagnosis/Practice Pattern: H      Number of Comorbidities:  []0     [x]1-2    []3+     Latex Allergy:  [x]NO      []YES  Preferred Language for Healthcare:   [x]English       []other:      Pain level:  0/10     SUBJECTIVE:  Patient reports his knee gets stiff after sitting in his car for long hours for work. He is trying to be better with icing to reduce swelling after the end of a long day. OBJECTIVE: See eval  ? Observation: continued mild pitting edema of L knee     ?  Test measurements: See below      PT Practice Pattern: H  Co morbidities: 1-2  surgical   INITIAL VISIT  CURRENT VISIT  21   PAIN  2/10 0/10 [x] (38079) Provided verbal/tactile cueing for activities related to improving balance, coordination, kinesthetic sense, posture, motor skill, proprioception  to assist with LE, proximal hip, and core control in self care, mobility, lifting, ambulation and eccentric single leg control. NMR and Therapeutic Activities:    [] (49959 or 08119) Provided verbal/tactile cueing for activities related to improving balance, coordination, kinesthetic sense, posture, motor skill, proprioception and motor activation to allow for proper function of core, proximal hip and LE with self care and ADLs  [] (33793) Gait Re-education- Provided training and instruction to the patient for proper LE, core and proximal hip recruitment and positioning and eccentric body weight control with ambulation re-education including up and down stairs     Home Exercise Program:    [x] (30693) Reviewed/Progressed HEP activities related to strengthening, flexibility, endurance, ROM of core, proximal hip and LE for functional self-care, mobility, lifting and ambulation/stair navigation   [] (74865)Reviewed/Progressed HEP activities related to improving balance, coordination, kinesthetic sense, posture, motor skill, proprioception of core, proximal hip and LE for self care, mobility, lifting, and ambulation/stair navigation      Manual Treatments:  PROM / STM / Oscillations-Mobs:  G-I, II, III, IV (PA's, Inf., Post.)  [x] (75697) Provided manual therapy to mobilize LE, proximal hip and/or LS spine soft tissue/joints for the purpose of modulating pain, promoting relaxation,  increasing ROM, reducing/eliminating soft tissue swelling/inflammation/restriction, improving soft tissue extensibility and allowing for proper ROM for normal function with self care, mobility, lifting and ambulation. Modalities:   Declined    [] GAME READY (VASO)- for significant edema, swelling, pain control. x10'      Charges:  Timed Code Treatment Minutes: 36' Progression Towards Functional goals:  [x] Patient is progressing as expected towards functional goals listed. [] Progression is slowed due to complexities listed. [] Progression has been slowed due to co-morbidities. [] Plan just implemented, too soon to assess goals progression  [] Other:         Overall Progression Towards Functional goals/ Treatment Progress Update:  [x] Patient is progressing as expected towards functional goals listed. [] Progression is slowed due to complexities/Impairments listed. [] Progression has been slowed due to co-morbidities. [] Plan just implemented, too soon to assess goals progression <30days   [] Goals require adjustment due to lack of progress  [] Patient is not progressing as expected and requires additional follow up with physician  [] Other    Prognosis for POC: [x] Good [] Fair  [] Poor      Patient requires continued skilled intervention: [x] Yes  [] No    Treatment/Activity Tolerance:  [x] Patient able to complete treatment  [] Patient limited by fatigue  [] Patient limited by pain    [] Patient limited by other medical complications  [] Other:     ASSESSMENT: Patient is doing well after L TKA on 1/13/21. Focus this session was on improving KF ROM and functional strengthening. He demonstrates improving ROM this session and tolerated new challenges well this visit. Patient was provided updated HEP to include progression of proximal hip strength and functional step ups. He was educated on importance of icing and continued elevation after long car rides when his leg is in a dependent position. Normally he has a physical job where he is required to negotiate stairs, spend time on his feet for prolonged hours ,and occasionally climb ladders- PT made patient aware he needs MD clearance for return to work. He was re-educated on importance of compliance with his HEP in order to see further progress with ROM and strengthening. Patient would benefit from cont skilled PT in order to progress towards full return to ADLs and functional mobility without limitations. Return to Play: (if applicable)   []  Stage 1: Intro to Strength   []  Stage 2: Return to Run and Strength   []  Stage 3: Return to Jump and Strength   []  Stage 4: Dynamic Strength and Agility   []  Stage 5: Sport Specific Training     []  Ready to Return to Play, Meets All Above Stages   []  Not Ready for Return to Sports   Comments:                               PLAN: See eval. Cont L knee ROM and strength. Continue Gait and functional retraining NV. Recommend continued PT 2x/week for 3 more weeks. [x] Continue per plan of care [] Alter current plan (see comments above)  [] Plan of care initiated [] Hold pending MD visit [] Discharge      Electronically signed by:  Sadiq Kowalski, PT, DPT, [facility] [id number]     Note: If patient does not return for scheduled/ recommended follow up visits, this note will serve as a discharge from care along with most recent update on progress.

## 2021-03-09 ENCOUNTER — HOSPITAL ENCOUNTER (OUTPATIENT)
Dept: PHYSICAL THERAPY | Age: 66
Setting detail: THERAPIES SERIES
Discharge: HOME | End: 2021-03-09
Payer: COMMERCIAL

## 2021-03-09 PROCEDURE — 97016 VASOPNEUMATIC DEVICE THERAPY: CPT

## 2021-03-09 PROCEDURE — 97110 THERAPEUTIC EXERCISES: CPT

## 2021-03-09 PROCEDURE — 97140 MANUAL THERAPY 1/> REGIONS: CPT

## 2021-03-09 NOTE — FLOWSHEET NOTE
[facility],  [address] [place]  Phone: [phone]  Fax [phone]    Physical Therapy Treatment Note/ Progress Report:           Date:  3/9/2021    Patient Name:  Luis F Connors    :  1955  MRN: [id number]  Restrictions/Precautions:    Medical/Treatment Diagnosis Information:  · Diagnosis: M17.12 Primary OA L knee; H01.896 s/p Left TKR DOS 21 at [facility]  · Treatment Diagnosis: M25.562 Left knee pain  Insurance/Certification information:  PT Insurance Information: [employer] [facility]  Physician Information:  Referring Practitioner: Angel Welch MD  Has the plan of care been signed (Y/N):        []  Yes  [x]  No     Date of Patient follow up with Physician: last visit on 21      Is this a Progress Report:     []  Yes  [x]  No        If Yes:  Date Range for reporting period:  Beginning 21  Ending 21    Progress report will be due (10 Rx or 30 days whichever is less):        Recertification will be due (POC Duration  / 90 days whichever is less): 21     Visit # Insurance Allowable Auth Required   In-person 13 60 []  Yes [x]  No    Telehealth     []  Yes []  No    Total          Functional Scale: LEFS: 27/80;66%    Date assessed:  21      Therapy Diagnosis/Practice Pattern: H      Number of Comorbidities:  []0     [x]1-2    []3+     Latex Allergy:  [x]NO      []YES  Preferred Language for Healthcare:   [x]English       []other:      Pain level:  0/10     SUBJECTIVE:  Patient feels he may have over done it yesterday after doing his home exercises and then going to the gym. Reports some increased swelling in his knee.       OBJECTIVE: See eval   Observation: continued mild pitting edema of L knee      Test measurements: See below      PT Practice Pattern: H  Co morbidities: 1-2  surgical   INITIAL VISIT  CURRENT VISIT  21   PAIN  2/10 0/10    FUNCTIONAL SCALE LEFS 27/80; 66% disability  67/80; 16% disability    ROM L KE Lacking 10 deg 0 deg PROM     L KF 85 deg 120 deg with PT OP                                    STRENGHT (MMT) Quad Fair; quad lag noted on SLR Good                                                          RESTRICTIONS/PRECAUTIONS: L TKR DOS 1/13/21    Exercises/Interventions:     [employer]: [id number]    Therapeutic Ex (33919) Sets/sec Reps Notes/CUES   5\"Hx15      15x2, 3\"H   Improved quad activation; form improves with VC    10\"x10     30\"x3     30\"x3     KF on SB with strap and OP 10\"x10     Slant board calf stretch 30\"x3     2x10      2x10 ea      30\"x3     Trustretch HS stretch 30\"x3     Trustretch KF stretch 30\"x3      2x15      10\"x10     3\"Hx20  3#   L 2x15 ea     2x15   110#   2x15   90#   3x10      2x10, 3\"H  35#   2x10, 3\"H  35#    2x15   60#    5'     LBW OVL20'x3     20'x3     30\"x5           Recumbent bike 6'  ROM KE, KF able to achieve full revolution this date         Pt ed on current condition, POC, expectations for therapy, HEP, stair negotiation, and safe use of cane, icing every hour                   Manual Intervention (54766)      STM L quads, HS ,gastroc , PFJ mobs, tibfem mobs, PROM with OP  KF, prone quad stretch 15'     tibfib mobs for KF     Seated KF MWM, seated knee distraction   Improved mobility following        Prone quad stretch 3'           NMR re-education (71542)   CUES NEEDED      VC for technique    2x15 ea     2x15  improved eccentric control     10'x5  30#    2x15      2x10  Cued for eccentric control/balance     5\"H 2x15   VC for symmetrical WB               Therapeutic Activity (34299)                                                Therapeutic Exercise and NMR EXR  [x] (20374) Provided verbal/tactile cueing for activities related to strengthening, flexibility, endurance, ROM for improvements in LE, proximal hip, and core control with self care, mobility, lifting, ambulation.   [x] (02462) Provided verbal/tactile cueing for activities related to improving balance, coordination, kinesthetic sense, posture, motor skill, proprioception  to assist with LE, proximal hip, and core control in self care, mobility, lifting, ambulation and eccentric single leg control. NMR and Therapeutic Activities:    [] (81510 or 78786) Provided verbal/tactile cueing for activities related to improving balance, coordination, kinesthetic sense, posture, motor skill, proprioception and motor activation to allow for proper function of core, proximal hip and LE with self care and ADLs  [] (01836) Gait Re-education- Provided training and instruction to the patient for proper LE, core and proximal hip recruitment and positioning and eccentric body weight control with ambulation re-education including up and down stairs     Home Exercise Program:    [x] (95667) Reviewed/Progressed HEP activities related to strengthening, flexibility, endurance, ROM of core, proximal hip and LE for functional self-care, mobility, lifting and ambulation/stair navigation   [] (94718)Reviewed/Progressed HEP activities related to improving balance, coordination, kinesthetic sense, posture, motor skill, proprioception of core, proximal hip and LE for self care, mobility, lifting, and ambulation/stair navigation      Manual Treatments:  PROM / STM / Oscillations-Mobs:  G-I, II, III, IV (PA's, Inf., Post.)  [x] (53544) Provided manual therapy to mobilize LE, proximal hip and/or LS spine soft tissue/joints for the purpose of modulating pain, promoting relaxation,  increasing ROM, reducing/eliminating soft tissue swelling/inflammation/restriction, improving soft tissue extensibility and allowing for proper ROM for normal function with self care, mobility, lifting and ambulation. Modalities:   Declined    [x] GAME READY (VASO)- for significant edema, swelling, pain control. x10'      Charges:  Timed Code Treatment Minutes: 35'   Total Treatment Minutes: 37'     [facility] time in/time out:   (and requires time in and out for each CPT code)    [] EVAL (LOW) 08224   [] EVAL (MOD) 65795  [] EVAL (HIGH) 70624   [] RE-EVAL     [x] GW(49006) x 1    [] IONTO  [] NMR (93728) x     [x] VASO  [x] Manual (08372) x 1     [] Other:  [] TA x      [] Mech Traction (58150)  [] ES(attended) (46352)      [] ES (un) (87887):        GOALS:   Patient stated goal: Patient will be able to return to golf without L knee pain. - Not yet attempted   [x] Progressing: [] Met: [] Not Met: [] Adjusted    Therapist goals for Patient:   Short Term Goals: To be achieved in: 2 weeks  1. Independent in HEP and progression per patient tolerance, in order to prevent re-injury. [] Progressing: [x] Met: [] Not Met: [] Adjusted  2. Patient will have a decrease in pain to facilitate improvement in movement, function, and ADLs as indicated by Functional Deficits. [] Progressing: [x] Met: [] Not Met: [] Adjusted    Long Term Goals: To be achieved in: 8 weeks  1. Disability index score of 33% or less for the LEFS to assist with reaching prior level of function. [] Progressing: [x] Met: [] Not Met: [] Adjusted  2. Patient will demonstrate increased AROM to 125 deg of L KF, 0 deg of L KE to allow for proper joint functioning as indicated by patients Functional Deficits. [x] Progressing: [] Met: [] Not Met: [] Adjusted  3. Patient will demonstrate an increase in Strength to good proximal hip strength and control, within 5lb HHD in LE to allow for proper functional mobility as indicated by patients Functional Deficits. [x] Progressing: [] Met: [] Not Met: [] Adjusted  4. Patient will return to 95% of functional activities without increased symptoms or restriction. [x] Progressing: [] Met: [] Not Met: [] Adjusted  5.  Patient will be able to walk his dog without L knee pain(patient specific functional goal)    [] Progressing: [x] Met: [] Not Met: [] Adjusted       Progression Towards Functional goals:  [x] Patient is progressing as expected towards functional goals listed. [] Progression is slowed due to complexities listed. [] Progression has been slowed due to co-morbidities. [] Plan just implemented, too soon to assess goals progression  [] Other:         Overall Progression Towards Functional goals/ Treatment Progress Update:  [x] Patient is progressing as expected towards functional goals listed. [] Progression is slowed due to complexities/Impairments listed. [] Progression has been slowed due to co-morbidities. [] Plan just implemented, too soon to assess goals progression <30days   [] Goals require adjustment due to lack of progress  [] Patient is not progressing as expected and requires additional follow up with physician  [] Other    Prognosis for POC: [x] Good [] Fair  [] Poor      Patient requires continued skilled intervention: [x] Yes  [] No    Treatment/Activity Tolerance:  [x] Patient able to complete treatment  [] Patient limited by fatigue  [] Patient limited by pain    [] Patient limited by other medical complications  [] Other:     ASSESSMENT: Patient is doing well after L TKA on 1/13/21. Patient presented with increased swelling in his L knee after doing increased activity and exercises at the gym yesterday. Focus this date was on reducing swelling, improving KF with stretching and mobilization. 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 Normally he has a physical job where he is required to negotiate stairs, spend time on his feet for prolonged hours ,and occasionally climb ladders- PT made patient aware he needs MD clearance for return to work. He was re-educated on importance of compliance with his HEP in order to see further progress with ROM and strengthening. Patient would benefit from cont skilled PT in order to progress towards full return to ADLs and functional mobility without limitations.           Return to Play: (if applicable)   [] Stage 1: Intro to Strength   []  Stage 2: Return to Run and Strength   []  Stage 3: Return to Jump and Strength   []  Stage 4: Dynamic Strength and Agility   []  Stage 5: Sport Specific Training     []  Ready to Return to Play, Meets All Above Stages   []  Not Ready for Return to Sports   Comments:                               PLAN: See eval. Cont L knee ROM and strength. Continue Gait and functional retraining NV. Recommend continued PT 2x/week for 3 more weeks. [x] Continue per plan of care [] Alter current plan (see comments above)  [] Plan of care initiated [] Hold pending MD visit [] Discharge      Electronically signed by:  Mary Campos, PT, DPT, OCS [id number]     Note: If patient does not return for scheduled/ recommended follow up visits, this note will serve as a discharge from care along with most recent update on progress.

## 2021-03-11 ENCOUNTER — HOSPITAL ENCOUNTER (OUTPATIENT)
Dept: PHYSICAL THERAPY | Age: 66
Setting detail: THERAPIES SERIES
Discharge: HOME | End: 2021-03-11
Payer: COMMERCIAL

## 2021-03-11 PROCEDURE — 97110 THERAPEUTIC EXERCISES: CPT

## 2021-03-11 PROCEDURE — 97016 VASOPNEUMATIC DEVICE THERAPY: CPT

## 2021-03-11 PROCEDURE — 97140 MANUAL THERAPY 1/> REGIONS: CPT

## 2021-03-11 NOTE — FLOWSHEET NOTE
[facility],  [address] [place]  Phone: [phone]  Fax [phone]    Physical Therapy Treatment Note/ Progress Report:           Date:  3/11/2021    Patient Name:  Carine Soto    :  1955  MRN: [id number]  Restrictions/Precautions:    Medical/Treatment Diagnosis Information:  · Diagnosis: M17.12 Primary OA L knee; Q59.639 s/p Left TKR DOS 21 at [facility]  · Treatment Diagnosis: M25.562 Left knee pain  Insurance/Certification information:  PT Insurance Information: [employer]  Physician Information:  Referring Practitioner: Dayna Ireland MD  Has the plan of care been signed (Y/N):        []  Yes  [x]  No     Date of Patient follow up with Physician: last visit on 21      Is this a Progress Report:     []  Yes  [x]  No        If Yes:  Date Range for reporting period:  Beginning 21  Ending 21    Progress report will be due (10 Rx or 30 days whichever is less): Next progress        Recertification will be due (POC Duration  / 90 days whichever is less): 21     Visit # Insurance Allowable Auth Required   In-person 14 60 []  Yes [x]  No    Telehealth     []  Yes []  No    Total          Functional Scale: LEFS: 27/80;66%    Date assessed:  21      Therapy Diagnosis/Practice Pattern: H      Number of Comorbidities:  []0     [x]1-2    []3+     Latex Allergy:  [x]NO      []YES  Preferred Language for Healthcare:   [x]English       []other:      Pain level:  0/10     SUBJECTIVE:  Patient reports his knee feels better since last session with less soreness. He has been sedentary today and reports some stiffness. 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       OBJECTIVE: See eval   Observation: continued mild pitting edema of L knee      Test measurements: See below      PT Practice Pattern: H  Co morbidities: 1-2  surgical   INITIAL VISIT  CURRENT VISIT  3/11/21   PAIN  2/10 0/10    FUNCTIONAL SCALE LEFS 27/80; 66% disability  NT this date   ROM L KE Lacking 10 deg 0 deg PROM     L KF 85 deg 120 deg with PT OP following MT and stretching                                   STRENGHT (MMT) Quad Fair; quad lag noted on SLR Good                                                          RESTRICTIONS/PRECAUTIONS: L TKR DOS 1/13/21    Exercises/Interventions:     [employer]: [id number]    Therapeutic Ex (13837) Sets/sec Reps Notes/CUES   5\"Hx15      15x2, 3\"H   Improved quad activation; form improves with VC    10\"x10     30\"x3     30\"x3     KF on SB with strap and OP 10\"x10     Slant board calf stretch 30\"x3     2x10      2x10 ea      30\"x3     Trustretch HS stretch 30\"x3     Trustretch KF stretch 30\"x3      2x15      10\"x10     3\"Hx20  3#   L 2x15 ea     LP: DL a ABD OVL 2x15   110#   2 up/1 down  2x15   90#   3x10      2x10, 3\"H  35#   2x10, 3\"H  35#    2x15   60#    5'     LBW OVL20'x3     20'x3     Reformer KF [id number]\"x5     Prone quad stretch 30\"x4     Recumbent bike 6'  ROM KE, KF able to achieve full revolution this date         Pt ed on current condition, POC, expectations for therapy, HEP, stair negotiation, and safe use of cane, icing every hour                   Manual Intervention (39387)      STM L quads, HS ,gastroc , PFJ mobs, tibfem mobs, PROM with OP  KF, prone quad stretch 15'     tibfib mobs for KF     Seated KF MWM, seated knee distraction   Improved mobility following        Prone quad stretch 3'           NMR re-education (81312)   CUES NEEDED      VC for technique    2x15 ea     2x15  improved eccentric control     10'x5  30#    2x15      2x10  Cued for eccentric control/balance     5\"H 2x15   VC for symmetrical WB               Therapeutic Activity (19315)                                                Therapeutic Exercise and NMR EXR  [x] (17884) Provided verbal/tactile cueing ambulation. Modalities:   Declined    [x] GAME READY (VASO)- for significant edema, swelling, pain control. x10'      Charges:  Timed Code Treatment Minutes: 39'   Total Treatment Minutes: 54'     [facility] time in/time out:   (and requires time in and out for each CPT code)    [] EVAL (LOW) 455 1011   [] EVAL (MOD) 31764  [] EVAL (HIGH) 42579   [] RE-EVAL     [x] BG(20604) x 2    [] IONTO  [] NMR (49149) x     [x] VASO  [x] Manual (15361) x 1     [] Other:  [] TA x      [] Mech Traction (34078)  [] ES(attended) (58012)      [] ES (un) (85259):        GOALS:   Patient stated goal: Patient will be able to return to golf without L knee pain. - Not yet attempted   [x] Progressing: [] Met: [] Not Met: [] Adjusted    Therapist goals for Patient:   Short Term Goals: To be achieved in: 2 weeks  1. Independent in HEP and progression per patient tolerance, in order to prevent re-injury. [] Progressing: [x] Met: [] Not Met: [] Adjusted  2. Patient will have a decrease in pain to facilitate improvement in movement, function, and ADLs as indicated by Functional Deficits. [] Progressing: [x] Met: [] Not Met: [] Adjusted    Long Term Goals: To be achieved in: 8 weeks  1. Disability index score of 33% or less for the LEFS to assist with reaching prior level of function. [] Progressing: [x] Met: [] Not Met: [] Adjusted  2. Patient will demonstrate increased AROM to 125 deg of L KF, 0 deg of L KE to allow for proper joint functioning as indicated by patients Functional Deficits. [x] Progressing: [] Met: [] Not Met: [] Adjusted  3. Patient will demonstrate an increase in Strength to good proximal hip strength and control, within 5lb HHD in LE to allow for proper functional mobility as indicated by patients Functional Deficits. [x] Progressing: [] Met: [] Not Met: [] Adjusted  4. Patient will return to 95% of functional activities without increased symptoms or restriction. [x] Progressing: [] Met: [] Not Met: [] Adjusted  5. Patient will be able to walk his dog without L knee pain(patient specific functional goal)    [] Progressing: [x] Met: [] Not Met: [] Adjusted       Progression Towards Functional goals:  [x] Patient is progressing as expected towards functional goals listed. [] Progression is slowed due to complexities listed. [] Progression has been slowed due to co-morbidities. [] Plan just implemented, too soon to assess goals progression  [] Other:         Overall Progression Towards Functional goals/ Treatment Progress Update:  [x] Patient is progressing as expected towards functional goals listed. [] Progression is slowed due to complexities/Impairments listed. [] Progression has been slowed due to co-morbidities. [] Plan just implemented, too soon to assess goals progression <30days   [] Goals require adjustment due to lack of progress  [] Patient is not progressing as expected and requires additional follow up with physician  [] Other    Prognosis for POC: [x] Good [] Fair  [] Poor      Patient requires continued skilled intervention: [x] Yes  [] No    Treatment/Activity Tolerance:  [x] Patient able to complete treatment  [] Patient limited by fatigue  [] Patient limited by pain    [] Patient limited by other medical complications  [] Other:     ASSESSMENT: Patient continues to progress with improving tolerance to ADLs, work-related activities and endurance. His ROM has plaueated 0-120 degrees which will likely become his new baseline as this has remained his ROM for the last 2 weeks. He is now walking with his wife outside for exercise without issue and has returned to work demands at lower intensity though reports he does not intend to return at his previous level which included climbing ladders. 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        Normally he has a physical job where he is required to negotiate stairs, spend time on his feet for prolonged hours ,and occasionally climb ladders- PT made patient aware he needs MD clearance for return to work. He was re-educated on importance of compliance with his HEP in order to see further progress with ROM and strengthening. Patient would benefit from cont skilled PT in order to progress towards full return to ADLs and functional mobility without limitations. Return to Play: (if applicable)   []  Stage 1: Intro to Strength   []  Stage 2: Return to Run and Strength   []  Stage 3: Return to Jump and Strength   []  Stage 4: Dynamic Strength and Agility   []  Stage 5: Sport Specific Training     []  Ready to Return to Play, Meets All Above Stages   []  Not Ready for Return to Sports   Comments:                               PLAN: See eval. Cont L knee ROM and strength. Continue Gait and functional retraining NV. Recommend continued PT 2x/week for 3 more weeks. [x] Continue per plan of care [] Alter current plan (see comments above)  [] Plan of care initiated [] Hold pending MD visit [] Discharge      Electronically signed by:  Sunny Esqueda PT, DPT, [facility] [id number]     Note: If patient does not return for scheduled/ recommended follow up visits, this note will serve as a discharge from care along with most recent update on progress.

## 2021-03-18 ENCOUNTER — HOSPITAL ENCOUNTER (OUTPATIENT)
Dept: PHYSICAL THERAPY | Age: 66
Setting detail: THERAPIES SERIES
Discharge: HOME | End: 2021-03-18
Payer: COMMERCIAL

## 2021-03-18 PROCEDURE — 97110 THERAPEUTIC EXERCISES: CPT

## 2021-03-18 PROCEDURE — 97016 VASOPNEUMATIC DEVICE THERAPY: CPT

## 2021-03-18 PROCEDURE — 97140 MANUAL THERAPY 1/> REGIONS: CPT

## 2021-03-18 NOTE — FLOWSHEET NOTE
[facility],  [address] [place]  Phone: [phone]  Fax [phone]    Physical Therapy Treatment Note/ Progress Report:           Date:  3/18/2021    Patient Name:  Cristina Mccabe    :  1955  MRN: [id number]  Restrictions/Precautions:    Medical/Treatment Diagnosis Information:  · Diagnosis: M17.12 Primary OA L knee; X54.196 s/p Left TKR DOS 21 at [facility]  · Treatment Diagnosis: M25.562 Left knee pain  Insurance/Certification information:  PT Insurance Information: [employer]  Physician Information:  Referring Practitioner: Asim Boss MD  Has the plan of care been signed (Y/N):        []  Yes  [x]  No     Date of Patient follow up with Physician: last visit on 21      Is this a Progress Report:     []  Yes  [x]  No        If Yes:  Date Range for reporting period:  Beginning 21  Ending 21    Progress report will be due (10 Rx or 30 days whichever is less): Next progress 3/80/36       Recertification will be due (POC Duration  / 90 days whichever is less): 21     Visit # Insurance Allowable Auth Required   In-person 15 60 []  Yes [x]  No    Telehealth     []  Yes []  No    Total          Functional Scale: LEFS: 27/80;66%    Date assessed:  21      Therapy Diagnosis/Practice Pattern: H      Number of Comorbidities:  []0     [x]1-2    []3+     Latex Allergy:  [x]NO      []YES  Preferred Language for Healthcare:   [x]English       []other:      Pain level:  0/10     SUBJECTIVE:  Patient reports his knee has felt fine but had low back pain on arrival today that he notes he woke up with.       OBJECTIVE: See eval   Observation: continued mild pitting edema of L knee      Test measurements: See below      PT Practice Pattern: H  Co morbidities: 1-2  surgical   INITIAL VISIT  CURRENT VISIT  3/11/21   PAIN  2/10 0/10    FUNCTIONAL SCALE LEFS 27/80; 66% disability  NT this date   ROM L KE Lacking 10 deg 0 deg PROM     L KF 85 deg 120 deg with PT OP following MT and stretching                                   STRENGHT (MMT) Quad Fair; quad lag noted on SLR Good                                                          RESTRICTIONS/PRECAUTIONS: L TKR DOS 1/13/21    Exercises/Interventions:     [employer]: [id number]    Therapeutic Ex (19752) Sets/sec Reps Notes/CUES   5\"Hx15      15x2, 3\"H   Improved quad activation; form improves with VC    10\"x10     30\"x3     30\"x3     KF on SB with strap and OP 10\"x10     Slant board calf stretch 30\"x3     2x10      2x10 ea      30\"x3     Trustretch HS stretch 30\"x3     Trustretch KF stretch 30\"x3      2x15      10\"x10     3\"Hx20  3#   L 2x15 ea     LP: DL a ABD OVL 2x15   120#   2 up/1 down  2x15   100#   3x10      Leg extension 2x10, 3\"H  35#   Leg curls  2x10, 3\"H  35#    2x15   60#    5'     20'x3     20'x3     Reformer KF [id number]\"x5     Prone quad stretch 30\"x4                 LTR on SB 10x ea  Due to LBP on arrival          Recumbent bike 6'  ROM KE, KF able to achieve full revolution this date         Pt ed on current condition, POC, expectations for therapy, HEP, stair negotiation, and safe use of cane, icing every hour                   Manual Intervention (71703)      STM L quads, HS ,gastroc , PFJ mobs, tibfem mobs, PROM with OP  KF, prone quad stretch 10'     tibfib mobs for KF     Seated KF MWM, seated knee distraction   Improved mobility following        Prone quad stretch 3'           NMR re-education (12538)   CUES NEEDED      VC for technique    2x15 ea     2x15  improved eccentric control     10'x5  30#    2x15      2x10  Cued for eccentric control/balance     5\"H 2x15   VC for symmetrical WB               Therapeutic Activity (59323)                                                Therapeutic Exercise and NMR EXR  [x] (69544) Provided verbal/tactile cueing for activities related to strengthening, flexibility, endurance, ROM for improvements in LE, proximal control. x10'      Charges:  Timed Code Treatment Minutes: 39'   Total Treatment Minutes: 54'     [facility] time in/time out:   (and requires time in and out for each CPT code)    [] EVAL (LOW) 455 1011   [] EVAL (MOD) 48224  [] EVAL (HIGH) 41452   [] RE-EVAL     [x] WK(40440) x 2    [] IONTO  [] NMR (01102) x     [x] VASO  [x] Manual (45453) x 1     [] Other:  [] TA x      [] Mech Traction (16709)  [] ES(attended) (41225)      [] ES (un) (85680):        GOALS:   Patient stated goal: Patient will be able to return to golf without L knee pain. - Not yet attempted   [x] Progressing: [] Met: [] Not Met: [] Adjusted    Therapist goals for Patient:   Short Term Goals: To be achieved in: 2 weeks  1. Independent in HEP and progression per patient tolerance, in order to prevent re-injury. [] Progressing: [x] Met: [] Not Met: [] Adjusted  2. Patient will have a decrease in pain to facilitate improvement in movement, function, and ADLs as indicated by Functional Deficits. [] Progressing: [x] Met: [] Not Met: [] Adjusted    Long Term Goals: To be achieved in: 8 weeks  1. Disability index score of 33% or less for the LEFS to assist with reaching prior level of function. [] Progressing: [x] Met: [] Not Met: [] Adjusted  2. Patient will demonstrate increased AROM to 125 deg of L KF, 0 deg of L KE to allow for proper joint functioning as indicated by patients Functional Deficits. [x] Progressing: [] Met: [] Not Met: [] Adjusted  3. Patient will demonstrate an increase in Strength to good proximal hip strength and control, within 5lb HHD in LE to allow for proper functional mobility as indicated by patients Functional Deficits. [x] Progressing: [] Met: [] Not Met: [] Adjusted  4. Patient will return to 95% of functional activities without increased symptoms or restriction. [x] Progressing: [] Met: [] Not Met: [] Adjusted  5.  Patient will be able to walk his dog without L knee pain(patient specific functional goal)    [] Progressing: clearance for return to work. He was re-educated on importance of compliance with his HEP in order to see further progress with ROM and strengthening. Patient would benefit from cont skilled PT in order to progress towards full return to ADLs and functional mobility without limitations. Return to Play: (if applicable)   []  Stage 1: Intro to Strength   []  Stage 2: Return to Run and Strength   []  Stage 3: Return to Jump and Strength   []  Stage 4: Dynamic Strength and Agility   []  Stage 5: Sport Specific Training     []  Ready to Return to Play, Meets All Above Stages   []  Not Ready for Return to Sports   Comments:                               PLAN: See eval. Cont L knee ROM and strength. Plan for D/C NV. .    [x] Continue per plan of care [] Alter current plan (see comments above)  [] Plan of care initiated [] Hold pending MD visit [] Discharge      Electronically signed by:  Declan Harrington PT, DPT, [facility] [id number]     Note: If patient does not return for scheduled/ recommended follow up visits, this note will serve as a discharge from care along with most recent update on progress.

## 2021-03-23 ENCOUNTER — HOSPITAL ENCOUNTER (OUTPATIENT)
Dept: PHYSICAL THERAPY | Age: 66
Setting detail: THERAPIES SERIES
Discharge: HOME | End: 2021-03-23
Payer: COMMERCIAL

## 2021-03-23 ENCOUNTER — HOSPITAL ENCOUNTER (OUTPATIENT)
Dept: PHYSICAL THERAPY | Age: 66
Setting detail: THERAPIES SERIES
End: 2021-03-23
Payer: COMMERCIAL

## 2021-03-23 PROCEDURE — 97110 THERAPEUTIC EXERCISES: CPT

## 2021-03-23 NOTE — DISCHARGE SUMMARY
[facility],  [address]  Phone: [phone]  Fax [phone]    Physical Therapy Treatment Note/Discharge Summary:           Date:  3/23/2021    Patient Name:  Veronica Dumont    :  1955  MRN: [id number]  Restrictions/Precautions:    Medical/Treatment Diagnosis Information:  · Diagnosis: M17.12 Primary OA L knee; M06.256 s/p Left TKR DOS 21 at [facility]  · Treatment Diagnosis: M25.562 Left knee pain  Insurance/Certification information:  PT Insurance Information: [employer]  Physician Information:  Referring Practitioner: Evelina Goldmann, MD  Has the plan of care been signed (Y/N):        []  Yes  [x]  No     Date of Patient follow up with Physician: last visit on 21      Is this a Progress Report:     []  Yes  [x]  No        If Yes:  Date Range for reporting period:  Beginning 21  Ending 3/23/21    Progress report will be due (10 Rx or 30 days whichever is less): Next progress 3/26/21; Done        Recertification will be due (POC Duration  / 90 days whichever is less): 21     Visit # Insurance Allowable Auth Required   In-person 16 60 []  Yes [x]  No    Telehealth     []  Yes []  No    Total          Functional Scale: LEFS: 27/80;66%    Date assessed:  21      Therapy Diagnosis/Practice Pattern: H      Number of Comorbidities:  []0     [x]1-2    []3+     Latex Allergy:  [x]NO      []YES  Preferred Language for Healthcare:   [x]English       []other:      Pain level:  0/10     SUBJECTIVE:  Patient reports no issues since his last session. He is now working and going to the gym to do his exercises.     OBJECTIVE: See eval   Observation: continued mild pitting edema of L knee      Test measurements: See below      PT Practice Pattern: H  Co morbidities: 1-2  surgical   INITIAL VISIT  CURRENT VISIT  3/23/21   PAIN  2/10 0/10    FUNCTIONAL SCALE LEFS 27/80; 66% disability  67/80; 16% disability ROM L KE Lacking 10 deg 0 deg PROM     L KF 85 deg 120 deg with PT OP following MT and stretching                                   STRENGHT (MMT) Quad Fair; quad lag noted on SLR Good                                                          RESTRICTIONS/PRECAUTIONS: L TKR DOS 1/13/21     Exercises/Interventions:     [employer]: [id number]    Therapeutic Ex (14437) Sets/sec Reps Notes/CUES   5\"Hx15      15x2, 3\"H   Improved quad activation; form improves with VC    10\"x10     30\"x3     30\"x3      10\"x10     Slant board calf stretch 30\"x3     2x10      2x10 ea      30\"x3     Trustretch HS stretch 30\"x3     Trustretch KF stretch 30\"x3      2x15      10\"x10     3\"Hx20  3#   L 2x15 ea     LP: DL a ABD OVL 2x15   120#   2 up/1 down  2x15   100#   3x10      Leg extension 2x10, 3\"H  40#   Leg curls  2x10, 3\"H  50#   LORNA: TKE, HAB 2x15   60#    5'     20'x3     20'x3     30\"x5     Prone quad stretch 30\"x4                  10x ea  Due to LBP on arrival          Recumbent bike 6'  ROM KE, KF able to achieve full revolution this date         Pt ed on current condition, POC, expectations for therapy, HEP, stair negotiation, and safe use of cane, icing every hour                   Manual Intervention (29885)      STM L quads, HS ,gastroc , PFJ mobs, tibfem mobs, PROM with OP  KF, prone quad stretch 5'            Improved mobility following        Prone quad stretch 3'           NMR re-education (14240)   CUES NEEDED      VC for technique    FSU 6\" R/L 2x15 ea     FSD 6\" L LE 2x15  improved eccentric control     10'x5  30#    2x15      2x10  Cued for eccentric control/balance     5\"H 2x15   VC for symmetrical WB               Therapeutic Activity (98937)                                                Therapeutic Exercise and NMR EXR  [x] (80902) Provided verbal/tactile cueing for activities related to strengthening, flexibility, endurance, ROM for improvements in LE, proximal hip, and core control with self care, mobility, Minutes: 45'   Total Treatment Minutes: 45'     [facility] time in/time out:   (and requires time in and out for each CPT code)    [] EVAL (LOW) [phone]   [] EVAL (MOD) 12856  [] EVAL (HIGH) 31160   [] RE-EVAL     [x] OV(88514) x 3    [] IONTO  [] NMR (92826) x     [] VASO  [] Manual (52813) x      [] Other:  [] TA x      [] Mech Traction (74142)  [] ES(attended) (19372)      [] ES (un) (46989):        GOALS:   Patient stated goal: Patient will be able to return to golf without L knee pain. - Not yet attempted   [x] Progressing: [] Met: [] Not Met: [] Adjusted    Therapist goals for Patient:   Short Term Goals: To be achieved in: 2 weeks  1. Independent in HEP and progression per patient tolerance, in order to prevent re-injury. [] Progressing: [x] Met: [] Not Met: [] Adjusted  2. Patient will have a decrease in pain to facilitate improvement in movement, function, and ADLs as indicated by Functional Deficits. [] Progressing: [x] Met: [] Not Met: [] Adjusted    Long Term Goals: To be achieved in: 8 weeks  1. Disability index score of 33% or less for the LEFS to assist with reaching prior level of function. [] Progressing: [x] Met: [] Not Met: [] Adjusted  2. Patient will demonstrate increased AROM to 125 deg of L KF, 0 deg of L KE to allow for proper joint functioning as indicated by patients Functional Deficits. [x] Progressing: [] Met: [] Not Met: [] Adjusted  3. Patient will demonstrate an increase in Strength to good proximal hip strength and control, within 5lb HHD in LE to allow for proper functional mobility as indicated by patients Functional Deficits. [] Progressing: [x] Met: [] Not Met: [] Adjusted  4. Patient will return to 95% of functional activities without increased symptoms or restriction. [] Progressing: [x] Met: [] Not Met: [] Adjusted  5.  Patient will be able to walk his dog without L knee pain(patient specific functional goal)    [] Progressing: [x] Met: [] Not Met: [] Adjusted       Progression Towards Functional goals:  [x] Patient is progressing as expected towards functional goals listed. [] Progression is slowed due to complexities listed. [] Progression has been slowed due to co-morbidities. [] Plan just implemented, too soon to assess goals progression  [] Other:         Overall Progression Towards Functional goals/ Treatment Progress Update:  [x] Patient is progressing as expected towards functional goals listed. [] Progression is slowed due to complexities/Impairments listed. [] Progression has been slowed due to co-morbidities. [] Plan just implemented, too soon to assess goals progression <30days   [] Goals require adjustment due to lack of progress  [] Patient is not progressing as expected and requires additional follow up with physician  [] Other    Prognosis for POC: [x] Good [] Fair  [] Poor      Patient requires continued skilled intervention: [x] Yes  [] No    Treatment/Activity Tolerance:  [x] Patient able to complete treatment  [] Patient limited by fatigue  [] Patient limited by pain    [] Patient limited by other medical complications  [] Other:     ASSESSMENT: Patient has done well with skilled PT and has met nearly all of his STG and LTG. 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 Patient is demonstrating improved strength as well and good functional strength when doing stair negotiation. He is now walking with his wife outside for exercise without issue and has returned to work demands at lower intensity though reports he does not intend to return at his previous level which included climbing ladders. 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 Patient is now appropriate for discharge from skilled PT with continued performance of his HEP.                  Return to Play: (if applicable)   []  Stage 1: Intro to Strength   [] Stage 2: Return to Run and Strength   []  Stage 3: Return to Jump and Strength   []  Stage 4: Dynamic Strength and Agility   []  Stage 5: Sport Specific Training     []  Ready to Return to Play, Meets All Above Stages   []  Not Ready for Return to Sports   Comments:                               PLAN: D/C to HEP. [] Continue per plan of care [] Alter current plan (see comments above)  [] Plan of care initiated [] Hold pending MD visit [x] Discharge      Electronically signed by:  Declan Harrington PT, DPT, [facility] [id number]     Note: If patient does not return for scheduled/ recommended follow up visits, this note will serve as a discharge from care along with most recent update on progress.